# Patient Record
Sex: FEMALE | Race: WHITE | URBAN - METROPOLITAN AREA
[De-identification: names, ages, dates, MRNs, and addresses within clinical notes are randomized per-mention and may not be internally consistent; named-entity substitution may affect disease eponyms.]

---

## 2018-11-23 ENCOUNTER — OFFICE VISIT (OUTPATIENT)
Dept: FAMILY MEDICINE CLINIC | Facility: CLINIC | Age: 43
End: 2018-11-23
Payer: COMMERCIAL

## 2018-11-23 VITALS
SYSTOLIC BLOOD PRESSURE: 100 MMHG | DIASTOLIC BLOOD PRESSURE: 68 MMHG | HEART RATE: 108 BPM | TEMPERATURE: 98 F | OXYGEN SATURATION: 98 % | RESPIRATION RATE: 20 BRPM

## 2018-11-23 DIAGNOSIS — J00 ACUTE NASOPHARYNGITIS: Primary | ICD-10-CM

## 2018-11-23 PROCEDURE — 99202 OFFICE O/P NEW SF 15 MIN: CPT | Performed by: NURSE PRACTITIONER

## 2018-11-23 RX ORDER — RIZATRIPTAN BENZOATE 10 MG/1
10 TABLET ORAL AS NEEDED
Refills: 0 | COMMUNITY
Start: 2018-11-13 | End: 2022-02-03 | Stop reason: ALTCHOICE

## 2018-11-23 NOTE — PROGRESS NOTES
CHIEF COMPLAINT:   Patient presents with:  Sore Throat  Ear Pain      HPI:   You Joaquin is a 44year old female who presents for upper respiratory symptoms for  4 days. Patient reports sore throat only at the beginning of sx's, congestion, ear pain.  [facility] Requested Prescriptions      No prescriptions requested or ordered in this encounter     Risks, benefits, and side effects of medication explained and discussed. Pt advised on use of OTC medications like Dayquil/Nyquil for symptom relief.       The patient · Put fluids back into your body. Take frequent sips of clear liquids such as water or broth. Avoid drinks that have a lot of sugar in them, such as juices and sodas. These can make diarrhea worse. Older children and adults can drink sports drinks.   · As y

## 2018-11-25 ENCOUNTER — TELEPHONE (OUTPATIENT)
Dept: FAMILY MEDICINE CLINIC | Facility: CLINIC | Age: 43
End: 2018-11-25

## 2018-11-25 NOTE — TELEPHONE ENCOUNTER
Pt called stating that her ST is not any better after 6 days and that she is losing her voice. Pt requesting something for the pain.  Vis lidocaine sent into pharm, pt informed

## 2018-11-25 NOTE — TELEPHONE ENCOUNTER
SW pt's fiance, requesting Hydrocodone for pain relief at this time. Explained that Viscous lidocaine was sent into the pharmacy and Vicodin cannot be prescribed here.  Pt's fiance argumentative about getting antibiotic \"why not air on the side of caution\

## 2022-02-03 PROBLEM — F32.5 MAJOR DEPRESSIVE DISORDER IN FULL REMISSION: Status: ACTIVE | Noted: 2022-02-03

## 2022-02-03 PROBLEM — G43.109 MIGRAINE WITH AURA AND WITHOUT STATUS MIGRAINOSUS, NOT INTRACTABLE: Status: RESOLVED | Noted: 2022-02-03 | Resolved: 2022-02-03

## 2022-02-03 PROBLEM — G43.009 MIGRAINE WITHOUT AURA AND WITHOUT STATUS MIGRAINOSUS, NOT INTRACTABLE: Status: ACTIVE | Noted: 2022-02-03

## 2022-02-03 PROBLEM — G43.109 MIGRAINE WITH AURA AND WITHOUT STATUS MIGRAINOSUS, NOT INTRACTABLE: Status: ACTIVE | Noted: 2022-02-03

## 2022-02-03 PROBLEM — F32.5 MAJOR DEPRESSIVE DISORDER IN FULL REMISSION (HCC): Status: ACTIVE | Noted: 2022-02-03

## 2022-02-04 ENCOUNTER — MED REC SCAN ONLY (OUTPATIENT)
Dept: INTERNAL MEDICINE CLINIC | Facility: CLINIC | Age: 47
End: 2022-02-04

## 2022-03-10 RX ORDER — BUPROPION HYDROCHLORIDE 150 MG/1
TABLET ORAL
Qty: 30 TABLET | Refills: 0 | OUTPATIENT
Start: 2022-03-10

## 2022-03-10 NOTE — TELEPHONE ENCOUNTER
Last OV pertinent to medication 2/3/22  Last refill date: 2/3/22#/refills: 30 w/ 0  When patient was asked to return for OV: 4 weeks for short term follow up  Upcomming appt/reason: None  Labs: NA        LMOVM to COB to schedule appointment.

## 2022-03-24 ENCOUNTER — TELEPHONE (OUTPATIENT)
Dept: INTERNAL MEDICINE CLINIC | Facility: CLINIC | Age: 47
End: 2022-03-24

## 2022-03-24 NOTE — TELEPHONE ENCOUNTER
Pt has mammogram ordered and Pt stated that it needs to be screening and not diagnostic. Please change order to be screening. Notify Pt when complete so she can schedule apppt.    Thank you

## 2022-04-05 ENCOUNTER — HOSPITAL ENCOUNTER (OUTPATIENT)
Dept: MAMMOGRAPHY | Facility: HOSPITAL | Age: 47
Discharge: HOME | End: 2022-04-05
Attending: INTERNAL MEDICINE
Payer: COMMERCIAL

## 2022-04-05 DIAGNOSIS — Z12.31 SCREENING MAMMOGRAM FOR BREAST CANCER: ICD-10-CM

## 2022-04-05 PROCEDURE — 77063 BREAST TOMOSYNTHESIS BI: CPT | Performed by: INTERNAL MEDICINE

## 2022-04-05 PROCEDURE — 77067 SCR MAMMO BI INCL CAD: CPT | Performed by: INTERNAL MEDICINE

## 2022-04-07 ENCOUNTER — OFFICE VISIT (OUTPATIENT)
Dept: INTERNAL MEDICINE CLINIC | Facility: CLINIC | Age: 47
End: 2022-04-07
Payer: COMMERCIAL

## 2022-04-07 VITALS
RESPIRATION RATE: 16 BRPM | TEMPERATURE: 97 F | WEIGHT: 145 LBS | SYSTOLIC BLOOD PRESSURE: 102 MMHG | HEART RATE: 80 BPM | OXYGEN SATURATION: 98 % | BODY MASS INDEX: 24 KG/M2 | DIASTOLIC BLOOD PRESSURE: 80 MMHG

## 2022-04-07 DIAGNOSIS — R04.0 EPISTAXIS: ICD-10-CM

## 2022-04-07 DIAGNOSIS — F32.5 MAJOR DEPRESSIVE DISORDER IN FULL REMISSION, UNSPECIFIED WHETHER RECURRENT (HCC): ICD-10-CM

## 2022-04-07 DIAGNOSIS — R92.8 ABNORMAL MAMMOGRAM: ICD-10-CM

## 2022-04-07 DIAGNOSIS — R51.9 INCREASED FREQUENCY OF HEADACHES: Primary | ICD-10-CM

## 2022-04-07 PROCEDURE — 3074F SYST BP LT 130 MM HG: CPT | Performed by: INTERNAL MEDICINE

## 2022-04-07 PROCEDURE — 99215 OFFICE O/P EST HI 40 MIN: CPT | Performed by: INTERNAL MEDICINE

## 2022-04-07 PROCEDURE — 3079F DIAST BP 80-89 MM HG: CPT | Performed by: INTERNAL MEDICINE

## 2022-04-07 RX ORDER — BUPROPION HYDROCHLORIDE 300 MG/1
300 TABLET ORAL DAILY
Qty: 90 TABLET | Refills: 1 | Status: SHIPPED | OUTPATIENT
Start: 2022-04-07

## 2022-04-07 RX ORDER — AZITHROMYCIN 250 MG/1
500 TABLET, FILM COATED ORAL DAILY
COMMUNITY
Start: 2022-03-14 | End: 2022-04-07 | Stop reason: ALTCHOICE

## 2022-04-07 RX ORDER — ACETAMINOPHEN AND CODEINE PHOSPHATE 300; 30 MG/1; MG/1
TABLET ORAL
COMMUNITY
Start: 2022-03-15 | End: 2022-04-07

## 2022-04-11 ENCOUNTER — MED REC SCAN ONLY (OUTPATIENT)
Dept: INTERNAL MEDICINE CLINIC | Facility: CLINIC | Age: 47
End: 2022-04-11

## 2022-04-26 ENCOUNTER — TELEPHONE (OUTPATIENT)
Dept: INTERNAL MEDICINE CLINIC | Facility: CLINIC | Age: 47
End: 2022-04-26

## 2022-04-27 ENCOUNTER — HOSPITAL ENCOUNTER (OUTPATIENT)
Dept: MAMMOGRAPHY | Facility: HOSPITAL | Age: 47
Discharge: HOME | End: 2022-04-27
Attending: INTERNAL MEDICINE
Payer: COMMERCIAL

## 2022-04-27 DIAGNOSIS — R92.8 ABNORMAL MAMMOGRAM: ICD-10-CM

## 2022-04-27 DIAGNOSIS — R92.2 INCONCLUSIVE MAMMOGRAM: ICD-10-CM

## 2022-04-27 PROCEDURE — 77062 BREAST TOMOSYNTHESIS BI: CPT | Performed by: INTERNAL MEDICINE

## 2022-04-27 PROCEDURE — 76642 ULTRASOUND BREAST LIMITED: CPT | Performed by: INTERNAL MEDICINE

## 2022-04-27 PROCEDURE — 77066 DX MAMMO INCL CAD BI: CPT | Performed by: INTERNAL MEDICINE

## 2022-05-09 ENCOUNTER — OFFICE VISIT (OUTPATIENT)
Dept: OBGYN CLINIC | Facility: CLINIC | Age: 47
End: 2022-05-09
Payer: COMMERCIAL

## 2022-05-09 VITALS
SYSTOLIC BLOOD PRESSURE: 102 MMHG | DIASTOLIC BLOOD PRESSURE: 52 MMHG | HEIGHT: 65.75 IN | HEART RATE: 91 BPM | WEIGHT: 142 LBS | BODY MASS INDEX: 23.1 KG/M2

## 2022-05-09 DIAGNOSIS — R63.8 UNABLE TO LOSE WEIGHT: ICD-10-CM

## 2022-05-09 DIAGNOSIS — R68.82 LOW LIBIDO: ICD-10-CM

## 2022-05-09 DIAGNOSIS — R61 NIGHT SWEATS: ICD-10-CM

## 2022-05-09 DIAGNOSIS — K59.09 CHRONIC CONSTIPATION: ICD-10-CM

## 2022-05-09 DIAGNOSIS — K92.1 BLOOD IN STOOL: ICD-10-CM

## 2022-05-09 DIAGNOSIS — N89.8 VAGINAL DRYNESS: ICD-10-CM

## 2022-05-09 DIAGNOSIS — M62.89 PELVIC FLOOR DYSFUNCTION IN FEMALE: ICD-10-CM

## 2022-05-09 DIAGNOSIS — R10.31 ABDOMINAL PAIN, RIGHT LOWER QUADRANT: ICD-10-CM

## 2022-05-09 DIAGNOSIS — N94.10 DYSPAREUNIA IN FEMALE: ICD-10-CM

## 2022-05-09 DIAGNOSIS — Z78.0 POSTMENOPAUSAL: Primary | ICD-10-CM

## 2022-05-09 DIAGNOSIS — N95.1 HOT FLASHES DUE TO MENOPAUSE: ICD-10-CM

## 2022-05-09 PROBLEM — E28.319 EARLY MENOPAUSE: Status: ACTIVE | Noted: 2021-04-28

## 2022-05-09 PROBLEM — C80.1 MALIGNANT NEOPLASM (HCC): Status: ACTIVE | Noted: 2022-05-09

## 2022-05-09 PROBLEM — E28.319 EARLY MENOPAUSE: Status: RESOLVED | Noted: 2021-04-28 | Resolved: 2022-05-09

## 2022-05-09 PROBLEM — M54.2 NECK PAIN: Status: ACTIVE | Noted: 2017-11-15

## 2022-05-09 LAB
CONTROL LINE PRESENT WITH A CLEAR BACKGROUND (YES/NO): YES YES/NO
PREGNANCY TEST, URINE: NEGATIVE

## 2022-05-09 PROCEDURE — 87660 TRICHOMONAS VAGIN DIR PROBE: CPT | Performed by: OBSTETRICS & GYNECOLOGY

## 2022-05-09 PROCEDURE — 87480 CANDIDA DNA DIR PROBE: CPT | Performed by: OBSTETRICS & GYNECOLOGY

## 2022-05-09 PROCEDURE — 87510 GARDNER VAG DNA DIR PROBE: CPT | Performed by: OBSTETRICS & GYNECOLOGY

## 2022-05-09 NOTE — PATIENT INSTRUCTIONS
Magnesium oxide 250-500 mg nightly   Or   Magnesium glycinate 250-500 mg nightly    Oxide is easier to find, cheaper, fairly well absorbed  Glycinate is harder to find, more expensive, but better absorbed, may have less GI side effects    Probably best form of magnesium for headaches is magnesium threonate. This is significantly more expensive & usually has to be ordered online. If oxide or glycinate forms are not working well enough, this one may be the next to try. Vitamin D3 [id number] international units per day. Omega 3 fatty acids (DHA & EPA)       The Role of Physical Therapy in the Treatment of Pelvic Floor Dysfunction:    Physical therapists are trained to evaluate and treat dysfunctions in the joints, muscles, nerves and scar. Physical therapists specifically trained in the area of pelvic health can identify the possible musculoskeletal causes of pelvic pain, bladder and bowel difficulties and develop a treatment plan specific to the individual suffering from this difficulties. What to expect at your first physical therapy appointment:   Your first visit will include an initial evaluation in a comfortable, private room by a therapist who has undergone advanced education and training in the evaluation and treatment of pelvic muscle dysfunction. The therapist will obtain a detailed history of your health, pain and activity limitations. She will also ask you about any bowel, bladder and sexual difficulties as these are in part controlled by the pelvic muscles. The therapist will then take a look at your posture, mobility of your spine and hips and strength and flexibility of pelvic girdle muscles. She will examine any scar tissue and trigger points in the muscles of your pelvic region. The therapist will also specifically examine the pelvic floor muscles. Your pelvic floor consists of a group of muscles that attach behind the pubic bone in the front to the tail bone in the back.  They are responsible for providing support to the pelvic joints and organs, relaxing to allow the passage of urine, stool and gas and mikey to prevent the loss of urine, stool and gas as appropriate. In order to best examine these muscles you will be asked to undress from the waist down and be covered with a sheet. The therapist will use a lubricated, gloved finger to identify painful muscles around and in your vagina or rectum then instruct you to contract and relax these muscles in order to determine how the muscles are functioning. Care is taken to make you as comfortable as possible with the exam.     Your therapist will discuss the evaluation results with you and provide you with education regarding your specific condition and the expectation of therapy. She will answer all of your questions and will work with you to establish a treatment plan based on the results of the evaluation and your goals for therapy. THE [facility] Pelvic Floor Physical Therapy    [address], [address] [facility] [place], [address] Ph: [phone] [address], [facility], 2nd floor. [facility]. Ph: [phone] & [phone]  [address], [place], [address] [facility]. Ph [phone]  [address], [address]. Ph [phone]  [address], [facility]. Ph [phone]  [address] [place] [address]. Ph [phone]     Lubricants    Aloe Cadabra  Good Clean Love  Pre-Seed (targeted for those attempting to conceive)   Restore    *Lubricants that are hypo-osmolar or iso-osmolar are preferable to hyper-osmolar formulations.

## 2022-05-10 PROBLEM — N76.0 BV (BACTERIAL VAGINOSIS): Status: ACTIVE | Noted: 2022-05-10

## 2022-05-10 PROBLEM — B96.89 BV (BACTERIAL VAGINOSIS): Status: ACTIVE | Noted: 2022-05-10

## 2022-06-08 ENCOUNTER — TELEPHONE (OUTPATIENT)
Dept: INTERNAL MEDICINE CLINIC | Facility: CLINIC | Age: 47
End: 2022-06-08

## 2022-06-08 NOTE — TELEPHONE ENCOUNTER
Patient called and was transferred to me due to being frustrated with the advise to go to a walk in clinic in [facility] where she is on vacation. Patient stated she has UTI s/s of frequent urination and needs an antibiotic. I explained to the patient we need an appointment and urine test in order to safely and properly prescribe. I also informed the pt that the providers can only conduct virtual visits if the patient is in the state of [place]. Patient hung up the call.

## 2022-07-27 RX ORDER — RIZATRIPTAN BENZOATE 10 MG/1
TABLET ORAL
Qty: 30 TABLET | Refills: 1 | Status: SHIPPED | OUTPATIENT
Start: 2022-07-27

## 2022-07-27 NOTE — TELEPHONE ENCOUNTER
Last OV relevant to medication: 4/7/22  Last refill date: 2/3/22 30    #/refills: 1  When pt was asked to return for OV: 10/7/22  Upcoming appt/reason: No future appointments.     Was pt informed of any over due labs: n/a   Lab Results   Component Value Date    GLU 86 05/18/2022    BUN 13 05/18/2022    BUNCREA NOT APPLICABLE 38/48/9659    CREATSERUM 0.95 05/18/2022    GFRNAA 72 05/18/2022    GFRAA 83 05/18/2022    CA 9.5 05/18/2022    ALKPHO 74 05/18/2022    AST 18 05/18/2022    ALT 11 05/18/2022    BILT 0.6 05/18/2022    TP 6.4 05/18/2022    ALB 4.2 05/18/2022    GLOBULIN 2.2 05/18/2022    AGRATIO 1.9 05/18/2022     05/18/2022    K 4.2 05/18/2022     05/18/2022    CO2 26 05/18/2022

## 2022-11-05 ENCOUNTER — TELEPHONE (OUTPATIENT)
Dept: INTERNAL MEDICINE CLINIC | Facility: CLINIC | Age: 47
End: 2022-11-05

## 2022-11-05 NOTE — TELEPHONE ENCOUNTER
Incoming (mail or fax):  fax  Received from:  [facility]  Documentation given to:  Dr Cheatham Shows incoming bin    Pathology resutls

## 2022-11-10 RX ORDER — BUPROPION HYDROCHLORIDE 300 MG/1
300 TABLET ORAL DAILY
Qty: 90 TABLET | Refills: 0 | Status: SHIPPED | OUTPATIENT
Start: 2022-11-10 | End: 2023-04-25

## 2022-11-15 ENCOUNTER — MED REC SCAN ONLY (OUTPATIENT)
Dept: INTERNAL MEDICINE CLINIC | Facility: CLINIC | Age: 47
End: 2022-11-15

## 2022-11-16 PROBLEM — B96.89 BV (BACTERIAL VAGINOSIS): Status: RESOLVED | Noted: 2022-05-10 | Resolved: 2022-11-16

## 2022-11-16 PROBLEM — N76.0 BV (BACTERIAL VAGINOSIS): Status: RESOLVED | Noted: 2022-05-10 | Resolved: 2022-11-16

## 2022-11-16 PROBLEM — R10.31 ABDOMINAL PAIN, RIGHT LOWER QUADRANT: Status: RESOLVED | Noted: 2022-05-09 | Resolved: 2022-11-16

## 2022-11-16 PROBLEM — K92.1 BLOOD IN STOOL: Status: RESOLVED | Noted: 2022-05-09 | Resolved: 2022-11-16

## 2022-11-30 ENCOUNTER — TELEPHONE (OUTPATIENT)
Dept: INTERNAL MEDICINE CLINIC | Facility: CLINIC | Age: 47
End: 2022-11-30

## 2022-11-30 NOTE — TELEPHONE ENCOUNTER
LMTCB to Confirm with our office if Pt had reached out to her Ins [employer] yet regarding coverage for Cologuard. It was noted in 11/16/22 OV that pt was to check with Ins [employer] and get back to our office on whether we're to Fax the Cologuard Order Form or Not? Awaiting Call Back.

## 2022-12-29 ENCOUNTER — MED REC SCAN ONLY (OUTPATIENT)
Dept: INTERNAL MEDICINE CLINIC | Facility: CLINIC | Age: 47
End: 2022-12-29

## 2023-02-22 ENCOUNTER — TELEPHONE (OUTPATIENT)
Facility: CLINIC | Age: 48
End: 2023-02-22

## 2023-02-22 ENCOUNTER — OFFICE VISIT (OUTPATIENT)
Facility: CLINIC | Age: 48
End: 2023-02-22
Payer: COMMERCIAL

## 2023-02-22 VITALS
BODY MASS INDEX: 22.77 KG/M2 | DIASTOLIC BLOOD PRESSURE: 60 MMHG | WEIGHT: 140 LBS | SYSTOLIC BLOOD PRESSURE: 98 MMHG | HEIGHT: 65.75 IN | HEART RATE: 99 BPM

## 2023-02-22 DIAGNOSIS — Z01.419 WELL WOMAN EXAM WITH ROUTINE GYNECOLOGICAL EXAM: ICD-10-CM

## 2023-02-22 DIAGNOSIS — Z12.31 ENCOUNTER FOR SCREENING MAMMOGRAM FOR BREAST CANCER: ICD-10-CM

## 2023-02-22 DIAGNOSIS — Z12.4 PAPANICOLAOU SMEAR FOR CERVICAL CANCER SCREENING: Primary | ICD-10-CM

## 2023-02-22 PROCEDURE — 3078F DIAST BP <80 MM HG: CPT | Performed by: OBSTETRICS & GYNECOLOGY

## 2023-02-22 PROCEDURE — 3074F SYST BP LT 130 MM HG: CPT | Performed by: OBSTETRICS & GYNECOLOGY

## 2023-02-22 PROCEDURE — 3008F BODY MASS INDEX DOCD: CPT | Performed by: OBSTETRICS & GYNECOLOGY

## 2023-02-22 PROCEDURE — 99396 PREV VISIT EST AGE 40-64: CPT | Performed by: OBSTETRICS & GYNECOLOGY

## 2023-02-22 RX ORDER — ESTRADIOL 0.1 MG/G
1 CREAM VAGINAL DAILY
Qty: 1 EACH | Refills: 11 | Status: SHIPPED | OUTPATIENT
Start: 2023-02-22

## 2023-02-22 RX ORDER — PROGESTERONE 200 MG/1
200 CAPSULE ORAL AS DIRECTED
Qty: 30 CAPSULE | Refills: 11 | Status: SHIPPED | OUTPATIENT
Start: 2023-02-22

## 2023-02-22 NOTE — TELEPHONE ENCOUNTER
Name//spelling verified by pt review of label  Tubes labeled legible in front of patient  Hereditary Cancer Screening drawn, pt tolerated well. [employer] access, cost, result discussed. Pt. Kavin Mustafa. Specimen collected and placed in bin at clinical workstation for further processing by clinical staff.       [employer] COMMON HEREDITARY CANCERS PANEL [[id number]]  Ordered by Dr. Piero Mancilla

## 2023-03-06 ENCOUNTER — TELEPHONE (OUTPATIENT)
Facility: CLINIC | Age: 48
End: 2023-03-06

## 2023-04-25 RX ORDER — BUPROPION HYDROCHLORIDE 300 MG/1
TABLET ORAL
Qty: 90 TABLET | Refills: 0 | Status: SHIPPED | OUTPATIENT
Start: 2023-04-25

## 2023-04-25 NOTE — TELEPHONE ENCOUNTER
Last OV relevant to medication: 11/16/22  Last refill date: 11/10/22 90   #/refills: 0  When pt was asked to return for OV: 6 months   Upcoming appt/reason:   Future Appointments   Date Time Provider Hortencia Ramos   5/10/2023 11:00 AM La Perez MD [facility] [place]   5/17/2023 11:00 AM FRANKLYN Castlilo [facility] [address] [place]       Was pt informed of any over due labs: n/a   Lab Results   Component Value Date    GLU 86 05/18/2022    BUN 13 05/18/2022    BUNCREA NOT APPLICABLE 50/60/6746    CREATSERUM 0.95 05/18/2022    GFRNAA 72 05/18/2022    GFRAA 83 05/18/2022    CA 9.5 05/18/2022    ALKPHO 74 05/18/2022    AST 18 05/18/2022    ALT 11 05/18/2022    BILT 0.6 05/18/2022    TP 6.4 05/18/2022    ALB 4.2 05/18/2022    GLOBULIN 2.2 05/18/2022    AGRATIO 1.9 05/18/2022     05/18/2022    K 4.2 05/18/2022     05/18/2022    CO2 26 05/18/2022

## 2023-05-17 ENCOUNTER — OFFICE VISIT (OUTPATIENT)
Dept: INTERNAL MEDICINE CLINIC | Facility: CLINIC | Age: 48
End: 2023-05-17
Payer: COMMERCIAL

## 2023-05-17 VITALS
RESPIRATION RATE: 14 BRPM | TEMPERATURE: 98 F | HEART RATE: 78 BPM | OXYGEN SATURATION: 98 % | SYSTOLIC BLOOD PRESSURE: 114 MMHG | HEIGHT: 66.14 IN | DIASTOLIC BLOOD PRESSURE: 72 MMHG | BODY MASS INDEX: 22.96 KG/M2 | WEIGHT: 142.88 LBS

## 2023-05-17 DIAGNOSIS — Z12.11 ENCOUNTER FOR SCREENING FOR MALIGNANT NEOPLASM OF COLON: ICD-10-CM

## 2023-05-17 DIAGNOSIS — G43.009 MIGRAINE WITHOUT AURA AND WITHOUT STATUS MIGRAINOSUS, NOT INTRACTABLE: ICD-10-CM

## 2023-05-17 DIAGNOSIS — Z13.29 SCREENING FOR THYROID DISORDER: ICD-10-CM

## 2023-05-17 DIAGNOSIS — Z00.00 ENCOUNTER FOR ANNUAL PHYSICAL EXAM: Primary | ICD-10-CM

## 2023-05-17 DIAGNOSIS — B35.1 ONYCHOMYCOSIS: ICD-10-CM

## 2023-05-17 DIAGNOSIS — K21.9 GASTROESOPHAGEAL REFLUX DISEASE, UNSPECIFIED WHETHER ESOPHAGITIS PRESENT: ICD-10-CM

## 2023-05-17 DIAGNOSIS — Z85.828 HISTORY OF BASAL CELL CARCINOMA (BCC): ICD-10-CM

## 2023-05-17 DIAGNOSIS — Z13.220 SCREENING FOR LIPID DISORDERS: ICD-10-CM

## 2023-05-17 DIAGNOSIS — F33.41 RECURRENT MAJOR DEPRESSIVE DISORDER, IN PARTIAL REMISSION (HCC): ICD-10-CM

## 2023-05-17 DIAGNOSIS — Z13.1 SCREENING FOR DIABETES MELLITUS: ICD-10-CM

## 2023-05-17 PROBLEM — M54.2 NECK PAIN: Status: RESOLVED | Noted: 2017-11-15 | Resolved: 2023-05-17

## 2023-05-17 PROCEDURE — 99396 PREV VISIT EST AGE 40-64: CPT | Performed by: NURSE PRACTITIONER

## 2023-05-17 PROCEDURE — 3078F DIAST BP <80 MM HG: CPT | Performed by: NURSE PRACTITIONER

## 2023-05-17 PROCEDURE — 3008F BODY MASS INDEX DOCD: CPT | Performed by: NURSE PRACTITIONER

## 2023-05-17 PROCEDURE — 99214 OFFICE O/P EST MOD 30 MIN: CPT | Performed by: NURSE PRACTITIONER

## 2023-05-17 PROCEDURE — 3074F SYST BP LT 130 MM HG: CPT | Performed by: NURSE PRACTITIONER

## 2023-05-17 RX ORDER — SERTRALINE HYDROCHLORIDE 25 MG/1
25 TABLET, FILM COATED ORAL DAILY
Qty: 90 TABLET | Refills: 0 | Status: SHIPPED | OUTPATIENT
Start: 2023-05-17

## 2023-06-20 NOTE — TELEPHONE ENCOUNTER
Patient states she is coming from over an hour away, and would be 15 minutes late. Patient asked to switch the cream she was prescribed to a pill because she does not like the cream. States Dr. Nando Matos once prescribed another cream that worked better and wants to know if she can either have a pill or the same cream MM prescribed a while ago. Please advise.

## 2023-06-21 RX ORDER — ESTRADIOL 1 MG/1
1 TABLET ORAL DAILY
Qty: 90 TABLET | Refills: 0 | OUTPATIENT
Start: 2023-06-21

## 2023-06-24 LAB
ABSOLUTE BASOPHILS: 49 CELLS/UL (ref 0–200)
ABSOLUTE EOSINOPHILS: 164 CELLS/UL (ref 15–500)
ABSOLUTE LYMPHOCYTES: 1173 CELLS/UL (ref 850–3900)
ABSOLUTE MONOCYTES: 508 CELLS/UL (ref 200–950)
ABSOLUTE NEUTROPHILS: 6306 CELLS/UL (ref 1500–7800)
ALBUMIN/GLOBULIN RATIO: 1.7 (CALC) (ref 1–2.5)
ALBUMIN: 4.4 G/DL (ref 3.6–5.1)
ALKALINE PHOSPHATASE: 102 U/L (ref 31–125)
ALT: 19 U/L (ref 6–29)
AST: 18 U/L (ref 10–35)
BASOPHILS: 0.6 %
BILIRUBIN, TOTAL: 0.8 MG/DL (ref 0.2–1.2)
BUN: 15 MG/DL (ref 7–25)
CALCIUM: 10.2 MG/DL (ref 8.6–10.2)
CARBON DIOXIDE: 26 MMOL/L (ref 20–32)
CHLORIDE: 108 MMOL/L (ref 98–110)
CHOL/HDLC RATIO: 2.3 (CALC)
CHOLESTEROL, TOTAL: 158 MG/DL
CREATININE: 0.96 MG/DL (ref 0.5–0.99)
EGFR: 73 ML/MIN/1.73M2
EOSINOPHILS: 2 %
GLOBULIN: 2.6 G/DL (CALC) (ref 1.9–3.7)
GLUCOSE: 94 MG/DL (ref 65–139)
HDL CHOLESTEROL: 69 MG/DL
HEMATOCRIT: 42.5 % (ref 35–45)
HEMOGLOBIN A1C: 4.5 % OF TOTAL HGB
HEMOGLOBIN: 14.4 G/DL (ref 11.7–15.5)
LDL-CHOLESTEROL: 73 MG/DL (CALC)
LYMPHOCYTES: 14.3 %
MCH: 32.3 PG (ref 27–33)
MCHC: 33.9 G/DL (ref 32–36)
MCV: 95.3 FL (ref 80–100)
MONOCYTES: 6.2 %
MPV: 10.8 FL (ref 7.5–12.5)
NEUTROPHILS: 76.9 %
NON-HDL CHOLESTEROL: 89 MG/DL (CALC)
PLATELET COUNT: 265 THOUSAND/UL (ref 140–400)
POTASSIUM: 4.3 MMOL/L (ref 3.5–5.3)
PROTEIN, TOTAL: 7 G/DL (ref 6.1–8.1)
RDW: 12 % (ref 11–15)
RED BLOOD CELL COUNT: 4.46 MILLION/UL (ref 3.8–5.1)
SODIUM: 142 MMOL/L (ref 135–146)
TRIGLYCERIDES: 75 MG/DL
TSH W/REFLEX TO FT4: 3.93 MIU/L
WHITE BLOOD CELL COUNT: 8.2 THOUSAND/UL (ref 3.8–10.8)

## 2023-06-26 DIAGNOSIS — G43.009 MIGRAINE WITHOUT AURA AND WITHOUT STATUS MIGRAINOSUS, NOT INTRACTABLE: ICD-10-CM

## 2023-06-28 RX ORDER — RIZATRIPTAN BENZOATE 10 MG/1
10 TABLET ORAL AS NEEDED
Qty: 30 TABLET | Refills: 0 | Status: SHIPPED | OUTPATIENT
Start: 2023-06-28

## 2023-07-06 ENCOUNTER — TELEPHONE (OUTPATIENT)
Dept: INTERNAL MEDICINE CLINIC | Facility: CLINIC | Age: 48
End: 2023-07-06

## 2023-07-06 LAB — AMB EXT COLOGUARD RESULT: NEGATIVE

## 2023-07-06 NOTE — TELEPHONE ENCOUNTER
Incoming (mail or fax):  fax  Received from:  [employer]  Documentation given to:  Triage results bin    [facility]

## 2023-07-07 DIAGNOSIS — G43.009 MIGRAINE WITHOUT AURA AND WITHOUT STATUS MIGRAINOSUS, NOT INTRACTABLE: ICD-10-CM

## 2023-07-07 RX ORDER — RIZATRIPTAN BENZOATE 10 MG/1
TABLET ORAL
Qty: 30 TABLET | Refills: 0 | OUTPATIENT
Start: 2023-07-07

## 2023-08-24 DIAGNOSIS — G43.009 MIGRAINE WITHOUT AURA AND WITHOUT STATUS MIGRAINOSUS, NOT INTRACTABLE: ICD-10-CM

## 2023-08-24 NOTE — TELEPHONE ENCOUNTER
Last OV relevant to medication: 5.17.23   Last refill date:      #/refills:    Rizatriptan Benzoate     6.28.23        30/0  Bupropion                       4.25.23        90/0  When pt was asked to return for OV: Return in about 1 year (around 5/17/2024) for physical, med check. Upcoming appt/reason: no future appts scheduled. Was pt informed of any over due labs: appears utd; no new labs ordered    -\"She will think about starting therapy and let us know if interested\" per note from 5.17.23  [facility] Sent to patient to reach out neuro with contact information to schedule an appt as discussed during her last visit.        Lab Results   Component Value Date    GLU 94 06/23/2023    BUN 15 06/23/2023    BUNCREA NOT APPLICABLE 59/97/5630    CREATSERUM 0.96 06/23/2023    GFRNAA 72 05/18/2022    GFRAA 83 05/18/2022    CA 10.2 06/23/2023    ALKPHO 102 06/23/2023    AST 18 06/23/2023    ALT 19 06/23/2023    BILT 0.8 06/23/2023    TP 7.0 06/23/2023    ALB 4.4 06/23/2023    GLOBULIN 2.6 06/23/2023    AGRATIO 1.7 06/23/2023     06/23/2023    K 4.3 06/23/2023     06/23/2023    CO2 26 06/23/2023

## 2023-08-25 RX ORDER — RIZATRIPTAN BENZOATE 10 MG/1
10 TABLET ORAL AS NEEDED
Qty: 30 TABLET | Refills: 0 | Status: SHIPPED | OUTPATIENT
Start: 2023-08-25

## 2023-08-25 RX ORDER — BUPROPION HYDROCHLORIDE 300 MG/1
300 TABLET ORAL DAILY
Qty: 90 TABLET | Refills: 2 | Status: SHIPPED | OUTPATIENT
Start: 2023-08-25

## 2023-08-31 ENCOUNTER — TELEPHONE (OUTPATIENT)
Facility: CLINIC | Age: 48
End: 2023-08-31

## 2023-08-31 DIAGNOSIS — F33.41 RECURRENT MAJOR DEPRESSIVE DISORDER, IN PARTIAL REMISSION (HCC): ICD-10-CM

## 2023-08-31 RX ORDER — PROGESTERONE 200 MG/1
200 CAPSULE ORAL NIGHTLY
Qty: 90 CAPSULE | Refills: 5 | Status: SHIPPED | OUTPATIENT
Start: 2023-08-31

## 2023-08-31 RX ORDER — SERTRALINE HYDROCHLORIDE 25 MG/1
25 TABLET, FILM COATED ORAL DAILY
Qty: 90 TABLET | Refills: 1 | Status: SHIPPED | OUTPATIENT
Start: 2023-08-31

## 2023-08-31 RX ORDER — ESTRADIOL 1 MG/1
1 TABLET ORAL DAILY
Qty: 90 TABLET | Refills: 5 | Status: SHIPPED | OUTPATIENT
Start: 2023-08-31

## 2023-10-12 DIAGNOSIS — G43.009 MIGRAINE WITHOUT AURA AND WITHOUT STATUS MIGRAINOSUS, NOT INTRACTABLE: ICD-10-CM

## 2023-10-12 RX ORDER — RIZATRIPTAN BENZOATE 10 MG/1
10 TABLET ORAL AS NEEDED
Qty: 30 TABLET | Refills: 0 | Status: SHIPPED | OUTPATIENT
Start: 2023-10-12

## 2023-10-12 NOTE — TELEPHONE ENCOUNTER
Last OV relevant to medication: 5/17/23  Last refill date: 8/25/23 #30/refills: 0  When pt was asked to return for OV: 5/17/24  Upcoming appt/reason:   Future Appointments   Date Time Provider Hortencia Ramos   12/4/2023 10:30 AM Godfrey Avalos MD [facility] [place]   12/6/2023 10:40 AM Monica Guzman DO [facility]   Was pt informed of any over due labs: betty

## 2023-12-04 ENCOUNTER — OFFICE VISIT (OUTPATIENT)
Facility: CLINIC | Age: 48
End: 2023-12-04
Payer: COMMERCIAL

## 2023-12-04 VITALS
HEART RATE: 80 BPM | RESPIRATION RATE: 22 BRPM | HEIGHT: 66.14 IN | BODY MASS INDEX: 23.3 KG/M2 | WEIGHT: 145 LBS | SYSTOLIC BLOOD PRESSURE: 118 MMHG | DIASTOLIC BLOOD PRESSURE: 70 MMHG

## 2023-12-04 DIAGNOSIS — R68.82 LOW LIBIDO: ICD-10-CM

## 2023-12-04 DIAGNOSIS — M62.89 PELVIC FLOOR DYSFUNCTION IN FEMALE: ICD-10-CM

## 2023-12-04 DIAGNOSIS — Z80.3 FAMILY HISTORY OF BREAST CANCER IN SISTER: ICD-10-CM

## 2023-12-04 DIAGNOSIS — R23.2 HOT FLASHES: ICD-10-CM

## 2023-12-04 DIAGNOSIS — Z01.411 ENCOUNTER FOR WELL WOMAN EXAM WITH ABNORMAL FINDINGS: Primary | ICD-10-CM

## 2023-12-04 DIAGNOSIS — Z12.31 ENCOUNTER FOR SCREENING MAMMOGRAM FOR MALIGNANT NEOPLASM OF BREAST: ICD-10-CM

## 2023-12-04 DIAGNOSIS — R39.14 FEELING OF INCOMPLETE BLADDER EMPTYING: ICD-10-CM

## 2023-12-04 DIAGNOSIS — Z63.9 RELATIONSHIP DYSFUNCTION: ICD-10-CM

## 2023-12-04 DIAGNOSIS — Z12.4 SCREENING FOR CERVICAL CANCER: ICD-10-CM

## 2023-12-04 DIAGNOSIS — R35.0 URINARY FREQUENCY: ICD-10-CM

## 2023-12-04 PROCEDURE — 88175 CYTOPATH C/V AUTO FLUID REDO: CPT | Performed by: OBSTETRICS & GYNECOLOGY

## 2023-12-04 PROCEDURE — 87624 HPV HI-RISK TYP POOLED RSLT: CPT | Performed by: OBSTETRICS & GYNECOLOGY

## 2023-12-04 RX ORDER — ESTRADIOL 0.05 MG/D
1 PATCH TRANSDERMAL WEEKLY
Qty: 12 PATCH | Refills: 3 | Status: SHIPPED | OUTPATIENT
Start: 2023-12-04

## 2023-12-04 RX ORDER — PROGESTERONE 200 MG/1
200 CAPSULE ORAL NIGHTLY
Qty: 90 CAPSULE | Refills: 5 | Status: SHIPPED | OUTPATIENT
Start: 2023-12-04

## 2023-12-04 SDOH — SOCIAL STABILITY - SOCIAL INSECURITY: PROBLEM RELATED TO PRIMARY SUPPORT GROUP, UNSPECIFIED: Z63.9

## 2023-12-04 NOTE — PATIENT INSTRUCTIONS
Magnesium supplementation  Different choices based on goals:    Oral route:  Magnesium oxide 250-500 mg nightly - better choice if constipated, cheaper, less well absorbed  Magnesium glycinate 250-500 mg nightly - harder to find, more expensive, better absorbed, fewer GI side effects  Alternatives: magnesium chloride, magnesium sulfate. Magnesium threonate 2000 mg nightly - NOT for use in pregnancy. Penetrates blood brain barrier best. Least GI effects, more expensive. Do NOT recommend magnesium citrate - this is a laxative. Do not recommend taking at same time as prenatal vitamin (calcium in prenatal vitamin competes with magnesium for absorption)    Non-oral route:   Epsom salt soaks (baths or foot baths - you can absorb magnesium through the skin)   Magnesium lotions/body sprays. Love languages    Sexual dysfunction    Unfortunately sexual dysfunction and satisfaction are very common and somewhat difficult to figure out because they are often multifactorial.     Optimizing physical & mental healthy, reducing stress, and working on the quality of your relationship with your partner are actually the most important. Unfortunately anxiety & depression as well as the drugs used to treat those conditions are often associated with sexual dysfunction. There are two medications currently on the market for premenopausal women that actually decrease serotonin levels. These have not been studied in women with mental health conditions or who are taking psychiatric medications. These medications are also limited in their efficacy.      Addyi  -oral taken nightly  -initially had a black box warning from the FDA regarding hypotension (low blood pressure & fainting) which can be made worse with alcohol    Vyleesi  -self-administered injection that is taken at least 45 min before you plan to have sex,   -limited to 1 use in 24 hours  -limited to 8 usees per month    There is also a non-FDA approved (herbal) option:  Ristela  -2 pills per day   -over the counter - through [employer]. Can order via phone or website. A great option is to see a sex therapist. Mere Walden can search for a provider near you through the following websites:    [employer] of Sexuality Educators, Counselors, and Therapists  FebruarySpecials.    Society for Sex Therapy and Research  https://sstarnet.org/     [facility] Pelvic Floor Physical Therapy    [address], [address] [place] [address] [place], [address] Ph: [phone]  [address], 2nd floor. [facility]. Ph: [phone] & [phone]  [address], [place], [address] [facility]. Ph [phone]  [address], [address], [address]. Ph [phone]  [address], [facility]. Ph [phone]  [address] [place] [address]. Ph [phone]     The Role of Physical Therapy in the Treatment of Pelvic Floor Dysfunction:    Physical therapists are trained to evaluate and treat dysfunctions in the joints, muscles, nerves and scar. Physical therapists specifically trained in the area of pelvic health can identify the possible musculoskeletal causes of pelvic pain, bladder and bowel difficulties and develop a treatment plan specific to the individual suffering from this difficulties. What to expect at your first physical therapy appointment:   Your first visit will include an initial evaluation in a comfortable, private room by a therapist who has undergone advanced education and training in the evaluation and treatment of pelvic muscle dysfunction. The therapist will obtain a detailed history of your health, pain and activity limitations. She will also ask you about any bowel, bladder and sexual difficulties as these are in part controlled by the pelvic muscles. The therapist will then take a look at your posture, mobility of your spine and hips and strength and flexibility of pelvic girdle muscles.  She will examine any scar tissue and trigger points in the muscles of your pelvic region. The therapist will also specifically examine the pelvic floor muscles. Your pelvic floor consists of a group of muscles that attach behind the pubic bone in the front to the tail bone in the back. They are responsible for providing support to the pelvic joints and organs, relaxing to allow the passage of urine, stool and gas and mikey to prevent the loss of urine, stool and gas as appropriate. In order to best examine these muscles you will be asked to undress from the waist down and be covered with a sheet. The therapist will use a lubricated, gloved finger to identify painful muscles around and in your vagina or rectum then instruct you to contract and relax these muscles in order to determine how the muscles are functioning. Care is taken to make you as comfortable as possible with the exam.     Your therapist will discuss the evaluation results with you and provide you with education regarding your specific condition and the expectation of therapy. She will answer all of your questions and will work with you to establish a treatment plan based on the results of the evaluation and your goals for therapy.

## 2023-12-05 LAB — HPV I/H RISK 1 DNA SPEC QL NAA+PROBE: NEGATIVE

## 2023-12-08 LAB
.: NORMAL
.: NORMAL

## 2023-12-19 DIAGNOSIS — G43.009 MIGRAINE WITHOUT AURA AND WITHOUT STATUS MIGRAINOSUS, NOT INTRACTABLE: ICD-10-CM

## 2023-12-19 RX ORDER — RIZATRIPTAN BENZOATE 10 MG/1
10 TABLET ORAL AS NEEDED
Qty: 30 TABLET | Refills: 0 | Status: SHIPPED | OUTPATIENT
Start: 2023-12-19

## 2023-12-19 NOTE — TELEPHONE ENCOUNTER
Last OV relevant to medication: 5/17/23  Last refill date: 10/12/23     #/refills: 30/0  When pt was asked to return for OV: 1 yr  Upcoming appt/reason: PE- none sched  Was pt informed of any over due labs: none

## 2024-02-05 DIAGNOSIS — G43.009 MIGRAINE WITHOUT AURA AND WITHOUT STATUS MIGRAINOSUS, NOT INTRACTABLE: ICD-10-CM

## 2024-02-08 ENCOUNTER — TELEPHONE (OUTPATIENT)
Dept: INTERNAL MEDICINE CLINIC | Facility: CLINIC | Age: 49
End: 2024-02-08

## 2024-02-09 RX ORDER — RIZATRIPTAN BENZOATE 10 MG/1
10 TABLET ORAL AS NEEDED
Qty: 30 TABLET | Refills: 0 | Status: SHIPPED | OUTPATIENT
Start: 2024-02-09

## 2024-02-23 NOTE — PATIENT INSTRUCTIONS
Get your labs done. You should be fasting for at least 10 hours. If you take a multivitamin with Biotin or any biotin product it should be held for 3 days prior to getting your labs done. Start sertraline 25 mg daily. Monitor for any side effects. See the neurologist and gastroenterologist.      Complete and mail in the cologuard. Schedule your mammogram.    Keep appointment with gynecologist.     Follow up if toenail fungus worsens or does not improve within the next 6 months. Use over the counter Debrox ear drops. Apply 3 drops into each ear once daily x 7 days. Let water run into the ears during shower and clean with washcloth or towel. Do not use ear buds to clean the ears. Return to clinic as needed for ear irrigation in 10-14 days.
Yes

## 2024-03-18 NOTE — TELEPHONE ENCOUNTER
Spoke with pt. She had an appointment today but lives in [place] and was going to be late so needed to cancel. Was using Estradiol cream for menopausal symptoms. Thought she was getting oral estrogen as discussed at her visit in February but received the cream. She tried the cream and does not like it. Would like to try oral estrogen. Also taking Progesterone. Pt also C/O lower abdominal \"swelling\". She had the same swelling when she was in 5/2022 and had BV. She is requesting a refill on the Metrogel as well. I let her know we typically like to see pt's for a culture prior to sending rx, but will get a message to Dr. Olamide Frausto and call with recommendations. Estrace order pended. Verbalized understanding. Him/He

## 2024-03-19 ENCOUNTER — TELEPHONE (OUTPATIENT)
Dept: INTERNAL MEDICINE CLINIC | Facility: CLINIC | Age: 49
End: 2024-03-19

## 2024-03-19 DIAGNOSIS — F33.41 RECURRENT MAJOR DEPRESSIVE DISORDER, IN PARTIAL REMISSION (HCC): ICD-10-CM

## 2024-03-19 NOTE — TELEPHONE ENCOUNTER
Incoming (mail or fax):  fax  Received from:  [facility]  Documentation given to:  Triage In    PA needed for Rizatriptan Tb 10mg

## 2024-03-20 ENCOUNTER — TELEPHONE (OUTPATIENT)
Dept: INTERNAL MEDICINE CLINIC | Facility: CLINIC | Age: 49
End: 2024-03-20

## 2024-03-20 RX ORDER — SERTRALINE HYDROCHLORIDE 25 MG/1
25 TABLET, FILM COATED ORAL DAILY
Qty: 90 TABLET | Refills: 0 | Status: SHIPPED | OUTPATIENT
Start: 2024-03-20

## 2024-03-20 NOTE — TELEPHONE ENCOUNTER
Filled out as much as I could. Placed in provider bin for completion and sig. Attached last office note. Thanks!

## 2024-03-20 NOTE — TELEPHONE ENCOUNTER
Last OV relevant to medication: 5/17/23   Last refill date: 8/31/23     #/refills: 90/1   When pt was asked to return for OV: Return in about 1 year (around 5/17/2024) for physical, med check.    Upcoming appt/reason: No future appointments.    Was pt informed of any over due labs: no active labs past year from our office

## 2024-04-29 ENCOUNTER — TELEPHONE (OUTPATIENT)
Dept: INTERNAL MEDICINE CLINIC | Facility: CLINIC | Age: 49
End: 2024-04-29

## 2024-04-29 DIAGNOSIS — Z12.31 SCREENING MAMMOGRAM FOR BREAST CANCER: Primary | ICD-10-CM

## 2024-04-29 NOTE — TELEPHONE ENCOUNTER
Pt due for PE last Physical 5/17/23. Pt wanted to get order for mammogram. When order for mammogram is placed then Pt will call to schedule Physical with Daphnie. Pt would like to do both in the same day. So Pt was advised to schedule mammogram first and then call us to schedule Physical   Please notify Pt when mammogram order is placed   Thank you

## 2024-04-30 NOTE — TELEPHONE ENCOUNTER
Pt already scheduled it     Future Appointments   Date Time Provider Department Center   5/14/2024  5:40 PM NIKI [place] [facility]  [facility]

## 2024-05-14 ENCOUNTER — OFFICE VISIT (OUTPATIENT)
Dept: INTERNAL MEDICINE CLINIC | Facility: CLINIC | Age: 49
End: 2024-05-14

## 2024-05-14 ENCOUNTER — HOSPITAL ENCOUNTER (OUTPATIENT)
Dept: MAMMOGRAPHY | Facility: HOSPITAL | Age: 49
Discharge: HOME | End: 2024-05-14
Attending: NURSE PRACTITIONER

## 2024-05-14 VITALS
SYSTOLIC BLOOD PRESSURE: 110 MMHG | BODY MASS INDEX: 24.62 KG/M2 | WEIGHT: 153.19 LBS | TEMPERATURE: 98 F | DIASTOLIC BLOOD PRESSURE: 70 MMHG | RESPIRATION RATE: 20 BRPM | OXYGEN SATURATION: 98 % | HEIGHT: 66.14 IN | HEART RATE: 80 BPM

## 2024-05-14 DIAGNOSIS — G43.009 MIGRAINE WITHOUT AURA AND WITHOUT STATUS MIGRAINOSUS, NOT INTRACTABLE: ICD-10-CM

## 2024-05-14 DIAGNOSIS — K21.9 GASTROESOPHAGEAL REFLUX DISEASE, UNSPECIFIED WHETHER ESOPHAGITIS PRESENT: ICD-10-CM

## 2024-05-14 DIAGNOSIS — Z85.828 HISTORY OF BASAL CELL CARCINOMA (BCC): ICD-10-CM

## 2024-05-14 DIAGNOSIS — F33.41 RECURRENT MAJOR DEPRESSIVE DISORDER, IN PARTIAL REMISSION (HCC): ICD-10-CM

## 2024-05-14 DIAGNOSIS — R53.83 FATIGUE, UNSPECIFIED TYPE: ICD-10-CM

## 2024-05-14 DIAGNOSIS — Z01.82 ENCOUNTER FOR ALLERGY TESTING: ICD-10-CM

## 2024-05-14 DIAGNOSIS — Z13.228 SCREENING FOR ENDOCRINE, METABOLIC AND IMMUNITY DISORDER: ICD-10-CM

## 2024-05-14 DIAGNOSIS — Z13.29 SCREENING FOR ENDOCRINE, METABOLIC AND IMMUNITY DISORDER: ICD-10-CM

## 2024-05-14 DIAGNOSIS — Z13.0 SCREENING FOR ENDOCRINE, METABOLIC AND IMMUNITY DISORDER: ICD-10-CM

## 2024-05-14 DIAGNOSIS — Z13.220 SCREENING FOR LIPID DISORDERS: ICD-10-CM

## 2024-05-14 DIAGNOSIS — Z13.0 SCREENING FOR DEFICIENCY ANEMIA: ICD-10-CM

## 2024-05-14 DIAGNOSIS — Z13.1 SCREENING FOR DIABETES MELLITUS: ICD-10-CM

## 2024-05-14 DIAGNOSIS — Z13.29 SCREENING FOR THYROID DISORDER: ICD-10-CM

## 2024-05-14 DIAGNOSIS — Z79.890 ON HORMONE REPLACEMENT THERAPY: ICD-10-CM

## 2024-05-14 DIAGNOSIS — Z00.00 ENCOUNTER FOR ANNUAL PHYSICAL EXAM: Primary | ICD-10-CM

## 2024-05-14 DIAGNOSIS — Z12.31 SCREENING MAMMOGRAM FOR BREAST CANCER: ICD-10-CM

## 2024-05-14 PROCEDURE — 99396 PREV VISIT EST AGE 40-64: CPT | Performed by: NURSE PRACTITIONER

## 2024-05-14 PROCEDURE — 77067 SCR MAMMO BI INCL CAD: CPT | Performed by: NURSE PRACTITIONER

## 2024-05-14 PROCEDURE — 77063 BREAST TOMOSYNTHESIS BI: CPT | Performed by: NURSE PRACTITIONER

## 2024-05-14 PROCEDURE — 99214 OFFICE O/P EST MOD 30 MIN: CPT | Performed by: NURSE PRACTITIONER

## 2024-05-14 RX ORDER — RIZATRIPTAN BENZOATE 10 MG/1
10 TABLET ORAL AS NEEDED
Qty: 30 TABLET | Refills: 0 | Status: SHIPPED | OUTPATIENT
Start: 2024-05-14

## 2024-05-14 RX ORDER — ESTRADIOL 1 MG/1
0.5 TABLET ORAL DAILY
COMMUNITY
Start: 2024-03-19

## 2024-05-14 NOTE — PROGRESS NOTES
CHIEF COMPLAINT     Chief Complaint   Patient presents with    Routine Physical     Sees Gyne Last Pap 12/4/23 HPV neg Mammo 4/27/22 US 5/24 dx; right ovarian cyst  Cologuard done 7/6/23 3 yrs repeat.     HPI:   Anna Marie Carranza is a 49 year old female who presents for a complete physical exam, med check, and other concerns.     Diet is fair, could be better. Exercise is nothing regular. Alcohol use is occasional. No smoking. She is , no kids. She works in real estate. She lives in [place].      Labs to be ordered. Vaccines reviewed. Due for covid and tetanus - declines all vaccines. Sees GYNE in [place] now (her mom lives in [place]). Mammogram is scheduled for today. Pap done 12/2023 normal, hpv negative. On HRT for menopausal symptoms. Colonoscopy done in 2004, declines further colonoscopies since she has a bad experience. Did cologuard 7/2023, due in 2026. Wears seatbelt. No texting and driving. Has history of BCC, sees derm regularly.      Depression: On Wellbutrin and sertraline last year. Improved since starting the sertraline but feel her menopause makes the depression worse. No SHIP. PHQ-2: 0, CSSR: No risk.      Migraines: Have gotten worse since menopause. Having to take Maxalt frequently. Has not seen neuro yet, needs a new referral.     GERD: Reports daily heartburn x4-5 years. Takes Pepcid almost daily with relief. Has not seen GI yet. Does not follow dietary precautions.    Complains of ongoing fatigue. Wondering if I can check some labs and do cortisol testing.     She would also like allergy testing done. No known allergies besides to PCN.     Wt Readings from Last 6 Encounters:   05/14/24 153 lb 3.2 oz (69.5 kg)   12/04/23 145 lb (65.8 kg)   05/17/23 142 lb 14.4 oz (64.8 kg)   02/22/23 140 lb (63.5 kg)   11/16/22 139 lb 3.2 oz (63.1 kg)   05/09/22 142 lb (64.4 kg)     Body mass index is 24.62 kg/m².     CHOLESTEROL, TOTAL (mg/dL)   Date Value   06/23/2023 158   05/18/2022 155     HDL CHOLESTEROL  (mg/dL)   Date Value   06/23/2023 69   05/18/2022 67     LDL-CHOLESTEROL (mg/dL (calc))   Date Value   06/23/2023 73   05/18/2022 74     AST (U/L)   Date Value   06/23/2023 18   05/18/2022 18     ALT (U/L)   Date Value   06/23/2023 19   05/18/2022 11        Current Outpatient Medications   Medication Sig Dispense Refill    estradiol 1 MG Oral Tab Take 0.5 tablets (0.5 mg total) by mouth daily.      Rizatriptan Benzoate 10 MG Oral Tab Take 1 tablet (10 mg total) by mouth as needed for Migraine. 30 tablet 0    sertraline 50 MG Oral Tab Take 1 tablet (50 mg total) by mouth daily. 90 tablet 3    progesterone (PROMETRIUM) 200 MG Oral Cap Take 1 capsule (200 mg total) by mouth nightly. 90 capsule 5    buPROPion  MG Oral Tablet 24 Hr Take 1 tablet (300 mg total) by mouth daily. 90 tablet 2    ibuprofen 200 MG Oral Tab Take 1 tablet (200 mg total) by mouth every 6 (six) hours as needed.        Allergies   Allergen Reactions    Penicillins RASH      Past Medical History:    Abdominal pain    Abdominal pain, epigastric    Abdominal pain, right lower quadrant    ADHD (attention deficit hyperactivity disorder)    Basal cell carcinoma (BCC) of left upper extremity    LEFT. Path of biopsy SUPERFICIAL MULTIFOCAL BASAL CELL CARCINOMA, MARGINS FREE    Bilateral breast cysts    Benign bilateral subcentimeter cysts.    Bipolar 2 disorder (HCC)    Bloating symptom    Blood in stool    BV (bacterial vaginosis)    Calculus of kidney    passed on her own    Cancer (HCC)    Skin cancer    Cancer screening    [employer] Common Hereditary Cancers Panel = Negative    Chronic gastritis    Depression    Deviated nasal septum    Gastric ulcer    \"many years ago\"     GERD (gastroesophageal reflux disease)    Hiatal hernia    small hiatal hernia noted on EGD    History of tobacco use disorder    Hypertrophy, nasal, turbinate    IBS (irritable bowel syndrome)    Irregular menses    8/22/2018 Per Reshma Centeno NP for  irregular menses. Wanting to know if she can still conceive. Missing periods at times. AMH < 0.015, FSH 21.5    Kidney lesion, native, left    Major depressive disorder in full remission (HCC)    Menopause    Migraine with aura    Usually aroung the time of menstrual period plus 2-3 other times during the month    Motion sickness    Nausea and vomiting    Frequent nausea. Occasional vomitting.    Neck pain    Pap smear for cervical cancer screening    04/03/2017 Pap NILM - LMP: 03/20/2017 - RAGINI WILKINSON NP. [facility]     Pap smear for cervical cancer screening    Negative, per careeverywhere    Postmenopausal bleeding    When she had started PO progesterone & tried to increase dose per provider. Taking it for postmenopausal vasomotor symptoms. The bleeding resolved upon lowering the dose of progesterone.     Sinusitis    Sleep apnea      Past Surgical History:   Procedure Laterality Date    Colonoscopy  12/10/2004    Colonoscopy - 12/10/2004 Path -COLONIC MUCOSA WITH NO PATHOLOGIC CHANGE. Done for blood in stool. Path per [facility]. Reports IVS/ PONV and woke during procedure.    Egd  07/19/2016    small hiatal hernia, reactive gastropathy. +NSAID use    Enlarge breast with implant Bilateral     Lasik      Sinus surgery    11/29/2012    SINONASAL MUCOSA WITH CHRONIC SINUSITIS, FRAGMENTS OF BONE AND CARTILAGE.    Skin surgery      Basal Cell carcinoma removed      Family History   Problem Relation Age of Onset    Migraines Mother     Thyroid disease Mother     Depression Mother     Other (diverticulitis) Mother         portion of colon removed     Other (bladder dysfunction) Mother         incontinence    Skin cancer Father     Other (Rheumatoid arthritis) Father     Fibromyalgia Sister     Depression Sister     Breast Cancer Sister     Breast Cancer Maternal Grandmother 50    Colon Cancer Maternal Grandfather     Skin cancer Paternal Grandmother     Skin cancer Paternal Grandfather     Prostate  Cancer Paternal Uncle       Social History:   Social History     Socioeconomic History    Marital status:    Tobacco Use    Smoking status: Never    Smokeless tobacco: Never   Vaping Use    Vaping status: Never Used   Substance and Sexual Activity    Alcohol use: Yes    Drug use: Never    Sexual activity: Yes     Partners: Male        REVIEW OF SYSTEMS:   GENERAL: feels well otherwise  SKIN: denies any unusual skin lesions  EYES: denies blurred vision or double vision  HEENT: denies nasal congestion, sinus pain or ST  LUNGS: denies shortness of breath with exertion  CARDIOVASCULAR: denies chest pain on exertion  GI: denies abdominal pain, denies heartburn  : denies vaginal discharge or dysuria  MUSCULOSKELETAL: denies back pain  NEURO: headaches+  PSYCH: see HPI  HEMATOLOGIC: denies hx of anemia  ENDOCRINE: denies thyroid history  ALL/ASTHMA: denies hx of allergy or asthma    EXAM:   /70 (BP Location: Right arm, Patient Position: Sitting, Cuff Size: adult)   Pulse 80   Temp 97.8 °F (36.6 °C)   Resp 20   Ht 5' 6.14\" (1.68 m)   Wt 153 lb 3.2 oz (69.5 kg)   SpO2 98%   BMI 24.62 kg/m²   Body mass index is 24.62 kg/m².   GENERAL: well developed, well nourished, in no apparent distress  SKIN: no rashes, no suspicious lesions  HEENT: atraumatic, normocephalic, ears are clear  EYES: PERRLA, EOMI, conjunctiva are clear  NECK: supple, no adenopathy, no bruits, no thyroid masses  BREAST: DEFERRED TO GYNE  LUNGS: clear to auscultation, no rhonchi, rales, or wheezing  CARDIO: RRR without murmur  GI: good BS's, no masses, HSM or tenderness  : DEFERRED TO GYNE   MUSCULOSKELETAL: No obvious joint deformity or swelling. Normal gait  EXTREMITIES: no cyanosis, clubbing or edema  NEURO: oriented times three, cranial nerves are grossly intact, motor and sensory are grossly intact    LABS:     Lab Results   Component Value Date    WBC 8.2 06/23/2023    RBC 4.46 06/23/2023    HGB 14.4 06/23/2023    HCT 42.5  06/23/2023    MCV 95.3 06/23/2023    MCH 32.3 06/23/2023    MCHC 33.9 06/23/2023    RDW 12.0 06/23/2023     06/23/2023      Lab Results   Component Value Date    GLU 94 06/23/2023    BUN 15 06/23/2023    BUNCREA NOT APPLICABLE 06/23/2023    CREATSERUM 0.96 06/23/2023    GFRNAA 72 05/18/2022    GFRAA 83 05/18/2022    CA 10.2 06/23/2023    ALKPHO 102 06/23/2023    AST 18 06/23/2023    ALT 19 06/23/2023    BILT 0.8 06/23/2023    TP 7.0 06/23/2023    ALB 4.4 06/23/2023    GLOBULIN 2.6 06/23/2023    AGRATIO 1.7 06/23/2023     06/23/2023    K 4.3 06/23/2023     06/23/2023    CO2 26 06/23/2023      Lab Results   Component Value Date    CHOLEST 158 06/23/2023    TRIG 75 06/23/2023    HDL 69 06/23/2023    LDL 73 06/23/2023    TCHDLRATIO 2.3 06/23/2023    NONHDLC 89 06/23/2023      Lab Results   Component Value Date    TSHT4 3.93 06/23/2023      Lab Results   Component Value Date    A1C 4.5 06/23/2023       IMAGING:     No results found.     ASSESSMENT AND PLAN:     1. Encounter for annual physical exam  Anna Marie Carranza is a 49 year old female who presents for a complete physical exam. Sees gyne, pap is UTD. Breast and pelvic deferred to gyne per patient request. Reviewed diet and exercise. Pt' s weight is Body mass index is 24.62 kg/m².. Recommended regular exercise. The patient indicates understanding of these issues and agrees to the plan.  -Do fasting labs  -Do mammogram as scheduled today  -Due for tetanus and covid booster, declines  -Cologuard done 2023, due in 2026  - CBC With Differential With Platelet  - Comp Metabolic Panel  - Lipid Panel  - Hemoglobin A1C  - TSH W Reflex To Free T4    2. Recurrent major depressive disorder, in partial remission (HCC)  -PHQ-2: 0, CSSR: No risk  -Increase sertraline to 50 mg every day, monitor for SE  -Continue Wellbutrin  - sertraline 50 MG Oral Tab; Take 1 tablet (50 mg total) by mouth daily.  Dispense: 90 tablet; Refill: 3    3. Migraine without aura and  without status migrainosus, not intractable  -Maxalt refilled  -See neuro for frequent migraines   - Rizatriptan Benzoate 10 MG Oral Tab; Take 1 tablet (10 mg total) by mouth as needed for Migraine.  Dispense: 30 tablet; Refill: 0  - NEURO - INTERNAL    4. Gastroesophageal reflux disease, unspecified whether esophagitis present  -Antireflux precautions discussed  -See GI   - EVALUATE & TREAT, GASTRO (INTERNAL)    5. Fatigue, unspecified type  -Will check vitamin D, b12, and folate  - B12 AND FOLATE [1565] [Q]  - VITAMIN D, 25-HYDROXY [64434][Q]    6. Encounter for allergy testing  -Allergy testing ordered per patient request   - ADULT FOOD ALLERGY PROFILE [47591] [Q]  - ALLERGENS, ZONE 8 [97026] [Q]    7. History of basal cell carcinoma (BCC)  -Continue to see derm    8. On hormone replacement therapy  -Continue to see gyne    9. Screening for deficiency anemia  - CBC With Differential With Platelet    10. Screening for endocrine, metabolic and immunity disorder  - Comp Metabolic Panel    11. Screening for lipid disorders  - Lipid Panel    12. Screening for diabetes mellitus  - Hemoglobin A1C    13. Screening for thyroid disorder  - TSH W Reflex To Free T4     Return in about 1 year (around 5/14/2025) for physical, med check.    Daphnie Matt, FRANKLYN  5/14/2024

## 2024-05-15 PROBLEM — Z80.42 FAMILY HISTORY OF PROSTATE CANCER: Status: ACTIVE | Noted: 2024-05-15

## 2024-05-15 PROBLEM — Z80.0 FAMILY HISTORY OF COLON CANCER: Status: ACTIVE | Noted: 2024-05-15

## 2024-05-15 PROBLEM — Z80.3 FAMILY HISTORY OF BREAST CANCER IN SISTER: Status: ACTIVE | Noted: 2024-05-15

## 2024-05-15 PROBLEM — Z80.8 FAMILY HISTORY OF SKIN CANCER: Status: ACTIVE | Noted: 2024-05-15

## 2024-06-11 DIAGNOSIS — G43.009 MIGRAINE WITHOUT AURA AND WITHOUT STATUS MIGRAINOSUS, NOT INTRACTABLE: ICD-10-CM

## 2024-06-11 RX ORDER — RIZATRIPTAN BENZOATE 10 MG/1
10 TABLET ORAL AS NEEDED
Qty: 30 TABLET | Refills: 0 | Status: SHIPPED | OUTPATIENT
Start: 2024-06-11

## 2024-06-11 NOTE — TELEPHONE ENCOUNTER
Neurology Medications Eithmh2506/11/2024 04:38 PM   Protocol Details In person appointment or virtual visit in the past 6 mos or appointment in next 3 mos      3. Migraine without aura and without status migrainosus, not intractable  -Maxalt refilled  -See neuro for frequent migraines   - Rizatriptan Benzoate 10 MG Oral Tab; Take 1 tablet (10 mg total) by mouth as needed for Migraine.  Dispense: 30 tablet; Refill: 0

## 2024-06-11 NOTE — TELEPHONE ENCOUNTER
Is this medication prescribed by the [facility] Providers? Yes     Did the patient contact the pharmacy directly?:  yes they said that they sent it     Is patient out of meds or supply very low?:  1 pill left     Medication Requested:  Rizatriptan Benzoate 10 MG Oral Tab     Dose:      Is patient requesting a 30 or 90 day supply?:  90    Pharmacy name and phone # or location:  [id number] - [place], [place] - [address] AT [address] & [facility], [phone], [phone]     Is the patient due for an appointment?: no   (if so, please schedule appt)    Additional Notes:      Please advise the patient refills take up to 72 business hours.

## 2024-07-17 DIAGNOSIS — G43.009 MIGRAINE WITHOUT AURA AND WITHOUT STATUS MIGRAINOSUS, NOT INTRACTABLE: ICD-10-CM

## 2024-07-17 RX ORDER — RIZATRIPTAN BENZOATE 10 MG/1
10 TABLET ORAL AS NEEDED
Qty: 30 TABLET | Refills: 0 | Status: SHIPPED | OUTPATIENT
Start: 2024-07-17

## 2024-07-17 NOTE — TELEPHONE ENCOUNTER
Neurology Medications Ufeyvo1307/17/2024 02:30 PM   Protocol Details In person appointment or virtual visit in the past 6 mos or appointment in next 3 mos

## 2024-07-17 NOTE — TELEPHONE ENCOUNTER
Patient is in [place] and has run out of medication.  Could a small supply be sent to [facility] #[id number] - [place], [place] - [address] AT [facility] [address], [phone], [phone] [[id number]].  Please advise.  Thank you!

## 2024-08-28 RX ORDER — BUPROPION HYDROCHLORIDE 300 MG/1
300 TABLET ORAL DAILY
Qty: 90 TABLET | Refills: 2 | Status: SHIPPED | OUTPATIENT
Start: 2024-08-28

## 2024-08-28 NOTE — TELEPHONE ENCOUNTER
Psychiatric Non-Scheduled (Anti-Anxiety) Passed08/26/2024 03:08 PM   Protocol Details In person appointment or virtual visit in the past 6 mos or appointment in next 3 mos    Depression Screening completed within the past 12 months      2. Recurrent major depressive disorder, in partial remission (HCC)  -PHQ-2: 0, CSSR: No risk  -Increase sertraline to 50 mg every day, monitor for SE  -Continue Wellbutrin  No future appointments.

## 2024-09-12 DIAGNOSIS — G43.009 MIGRAINE WITHOUT AURA AND WITHOUT STATUS MIGRAINOSUS, NOT INTRACTABLE: ICD-10-CM

## 2024-09-12 RX ORDER — RIZATRIPTAN BENZOATE 10 MG/1
10 TABLET ORAL AS NEEDED
Qty: 30 TABLET | Refills: 0 | Status: SHIPPED | OUTPATIENT
Start: 2024-09-12

## 2024-09-12 NOTE — TELEPHONE ENCOUNTER
Neurology Medications [id number]/12/2024 10:05 AM   Protocol Details In person appointment or virtual visit in the past 6 mos or appointment in next 3 mo          No future appointments.

## 2024-10-02 ENCOUNTER — TELEPHONE (OUTPATIENT)
Dept: INTERNAL MEDICINE CLINIC | Facility: CLINIC | Age: 49
End: 2024-10-02

## 2024-10-02 DIAGNOSIS — G43.009 MIGRAINE WITHOUT AURA AND WITHOUT STATUS MIGRAINOSUS, NOT INTRACTABLE: Primary | ICD-10-CM

## 2024-10-02 NOTE — TELEPHONE ENCOUNTER
Patient requesting a referral for [facility], [address] [place] [address], [place] [address],  [phone] fax [phone].She does not have a doctor name nor an appointment.

## 2024-10-07 NOTE — TELEPHONE ENCOUNTER
Talked to pt and advised to check with insurance. Pt said her insurance will cover it and pt is most of the time in [place] . Referral placed and faxed.

## 2024-10-07 NOTE — TELEPHONE ENCOUNTER
Patient following up on requested referral for doctor in [place]    Insurance: [facility]  Provider's Name?:  doesn't have specific [facility]  Provider's Specialty?: neurology    Reason for Visit?: migraines   Diagnosis?:    Number of Visits Requested?: multiple    Last Visit with Specialist?: n/a   Is Appt. Already Scheduled?: no        If so, Date?:    Once referral is approved pt can see referral via [facility]

## 2024-11-04 RX ORDER — ESTRADIOL 1 MG/1
1 TABLET ORAL DAILY
Qty: 90 TABLET | Refills: 0 | Status: SHIPPED | OUTPATIENT
Start: 2024-11-04

## 2024-11-26 DIAGNOSIS — G43.009 MIGRAINE WITHOUT AURA AND WITHOUT STATUS MIGRAINOSUS, NOT INTRACTABLE: ICD-10-CM

## 2024-11-27 RX ORDER — RIZATRIPTAN BENZOATE 10 MG/1
10 TABLET ORAL AS NEEDED
Qty: 30 TABLET | Refills: 0 | Status: SHIPPED | OUTPATIENT
Start: 2024-11-27

## 2024-11-27 NOTE — TELEPHONE ENCOUNTER
Is this medication prescribed by the [facility] Providers? yes    Did the patient contact the pharmacy directly?:  yes    Is patient out of meds or supply very low?:  4 left    Medication Requested:  RIZATRIPTAN BENZOATE    Dose:  10 MG    Is patient requesting a 30 or 90 day supply?:  30    Pharmacy name and phone # or location:  [facility] #[id number] - [place], [place] - [address] AT [address] & [facility], [phone], [phone]     Is the patient due for an appointment?: no  (if so, please schedule appt)    Additional Notes:      Please advise the patient refills take up to 72 business hours.  
Last OV relevant to medication: 5/14/24  Last refill date: 9/12/24 #30/refills: 0  When pt was asked to return for OV: 5/14/25  Upcoming appt/reason:   Future Appointments   Date Time Provider Department Center   12/18/2024  3:00 PM Porsche Brown MD [facility]   Was pt informed of any over due labs: utd      
58

## 2024-12-04 ENCOUNTER — TELEPHONE (OUTPATIENT)
Dept: INTERNAL MEDICINE CLINIC | Facility: CLINIC | Age: 49
End: 2024-12-04

## 2024-12-04 NOTE — TELEPHONE ENCOUNTER
Received call from pharmacist- insurance is making them document with refills now the number of headaches per month before they can release Rizatriptan. Advised them we do not have this info documented in notes, pharmacist cannot call patient to ask-states this info has to come from provider. Message sent to pt to see how many headaches/month on average.

## 2024-12-05 ENCOUNTER — TELEPHONE (OUTPATIENT)
Dept: INTERNAL MEDICINE CLINIC | Facility: CLINIC | Age: 49
End: 2024-12-05

## 2024-12-05 NOTE — TELEPHONE ENCOUNTER
Rec message from other encounter from  today:  \"Patient states that the pharmacy told her they need the dr office to call the pharmacy and give an antidote in order to fill the prescription for Rizatriptan Benzoate 10 MG. She says they told her it is something new the insurance needs to fill this medication script. \"    Spoke with patient. Migraines varies per month. Has about 10-20 migraines per month, but not every month.  Has 1 pill left.    Has appoinment with neuro on 12/18.  Called pharmacy. Verbal given of avg migraine per month to pharmacy. They state in future avg migraines per month has to be added as comment for insurance purpose.  Fyi- regarding above. Thanks!

## 2024-12-05 NOTE — TELEPHONE ENCOUNTER
Patient states that the pharmacy told her they need the dr office to call the pharmacy and give an antidote in order to fill the prescription for Rizatriptan Benzoate 10 MG. She says they told her it is something new the insurance needs to fill this medication script.

## 2024-12-05 NOTE — TELEPHONE ENCOUNTER
Incoming (mail or fax):  fax  Received from:  Britany  Documentation given to:  Triage incoming    Message to prescriber - needs annotation of # of headaches per month

## 2024-12-06 NOTE — TELEPHONE ENCOUNTER
We already called and gave verbal with this info- see note below from 12/5/24. Faxed this info to pharmacy again, fax confirmed

## 2024-12-18 ENCOUNTER — OFFICE VISIT (OUTPATIENT)
Dept: NEUROLOGY | Facility: CLINIC | Age: 49
End: 2024-12-18
Payer: COMMERCIAL

## 2024-12-18 VITALS
HEIGHT: 67 IN | OXYGEN SATURATION: 97 % | WEIGHT: 165 LBS | BODY MASS INDEX: 25.9 KG/M2 | DIASTOLIC BLOOD PRESSURE: 80 MMHG | SYSTOLIC BLOOD PRESSURE: 110 MMHG | RESPIRATION RATE: 16 BRPM | HEART RATE: 77 BPM

## 2024-12-18 DIAGNOSIS — Z84.89 FAMILY HISTORY OF BRAIN TUMOR: ICD-10-CM

## 2024-12-18 DIAGNOSIS — G43.909 EPISODIC MIGRAINE: ICD-10-CM

## 2024-12-18 DIAGNOSIS — M54.2 NECK PAIN: ICD-10-CM

## 2024-12-18 DIAGNOSIS — G44.52 NPDH (NEW PERSISTENT DAILY HEADACHE): Primary | ICD-10-CM

## 2024-12-18 PROCEDURE — 99205 OFFICE O/P NEW HI 60 MIN: CPT | Performed by: OTHER

## 2024-12-18 RX ORDER — RIMEGEPANT SULFATE 75 MG/75MG
75 TABLET, ORALLY DISINTEGRATING ORAL AS NEEDED
Qty: 8 TABLET | Refills: 0 | Status: SHIPPED | OUTPATIENT
Start: 2024-12-18 | End: 2025-12-18

## 2024-12-18 RX ORDER — RIMEGEPANT SULFATE 75 MG/75MG
75 TABLET, ORALLY DISINTEGRATING ORAL AS NEEDED
Qty: 16 TABLET | Refills: 3 | Status: SHIPPED | OUTPATIENT
Start: 2024-12-18 | End: 2025-12-18

## 2024-12-18 NOTE — PATIENT INSTRUCTIONS
Refill policies:    Allow 2-3 business days for refills; controlled substances may take longer.  Contact your pharmacy at least 5 days prior to running out of medication and have them send an electronic request or submit request through the “request refill” option in your [employer] account.  Refills are not addressed on weekends; covering physicians do not authorize routine medications on weekends.  No narcotics or controlled substances are refilled after noon on Fridays or by on call physicians.  By law, narcotics must be electronically prescribed.  A 30 day supply with no refills is the maximum allowed.  If your prescription is due for a refill, you may be due for a follow up appointment.  To best provide you care, patients receiving routine medications need to be seen at least once a year.  Patients receiving narcotic/controlled substance medications need to be seen at least once every 3 months.  In the event that your preferred pharmacy does not have the requested medication in stock (e.g. Backordered), it is your responsibility to find another pharmacy that has the requested medication available.  We will gladly send a new prescription to that pharmacy at your request.    Scheduling Tests:    If your physician has ordered radiology tests such as MRI or CT scans, please contact Central Scheduling at [phone] right away to schedule the test.  Once scheduled, the [facility] Centralized Referral Team will work with your insurance carrier to obtain pre-certification or prior authorization.  Depending on your insurance carrier, approval may take 3-10 days.  It is highly recommended patients assure they have received an authorization before having a test performed.  If test is done without insurance authorization, patient may be responsible for the entire amount billed.      Precertification and Prior Authorizations:  If your physician has recommended that you have a procedure or additional testing performed the [facility]  Centralized Referral Team will contact your insurance carrier to obtain pre-certification or prior authorization.    You are strongly encouraged to contact your insurance carrier to verify that your procedure/test has been approved and is a COVERED benefit.  Although the [facility] Centralized Referral Team does its due diligence, the insurance carrier gives the disclaimer that \"Although the procedure is authorized, this does not guarantee payment.\"    Ultimately the patient is responsible for payment.   Thank you for your understanding in this matter.  Paperwork Completion:  If you require FMLA or disability paperwork for your recovery, please make sure to either drop it off or have it faxed to our office at [phone]. Be sure the form has your name and date of birth on it.  The form will be faxed to our Forms Department and they will complete it for you.  There is a 25$ fee for all forms that need to be filled out.  Please be aware there is a 10-14 day turnaround time.  You will need to sign a release of information (THEO) form if your paperwork does not come with one.  You may call the Forms Department with any questions at [phone].  Their fax number is [phone].

## 2024-12-18 NOTE — PROGRESS NOTES
[facility] CONSULTATION    Date of consult: 12/18/2024    Assessment:    ICD-10-CM    1. NPDH (new persistent daily headache)  G44.52 MRI BRAIN (W+WO) (CPT=70553) [[id number]]      2. Family history of brain tumor  Z84.89 MRI BRAIN (W+WO) (CPT=70553) [[id number]]      3. Neck pain  M54.2 Referral to Physical Therapy and Rehab      4. Episodic migraine  G43.909 Rimegepant Sulfate (NURTEC) 75 MG Oral Tablet Dispersible          Plan:      Procedures    Referral to Physical Therapy and Rehab    MRI BRAIN (W+WO) (CPT=70553) [[id number]]     Headache diary advised  Migraine and Medication overuse headache education given  Nurtec every other day   See orders and medications filed with this encounter. The patient indicates understanding of these issues and agrees with the plan.  Discussed with patient/family regarding assessment, work up, care plan and possible adverse and side effects of the medications.  RTC 3 months, other options; anti CGRP mabs injection, if PT not helping neck pain, will obtain MRI c spine  Pt should go ER for any new or worsening symptoms and contact office for above tests' results, any possible side effects from medication or other concerns.    Subjective:   CC/Reason for consult: headache     HPI: Anna Marie Carranza is a 49 year old female with past medical history as listed below presents here for initial evaluation of worsening headache recently, right eye pain , headache from neck pain as well, no brain imaging done, family history of brain tumor per mother, pt is on antidepressant, take rizatriptan but not enough sometimes ; No new focal weakness, numbness, gait imbalance, vision or speech difficulties. 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    History/Other:   REVIEW OF SYSTEMS:  A comprehensive 14-point system was reviewed. Pertinent positives and negatives are noted in HPI.       Current Outpatient Medications:     Rimegepant Sulfate (NURTEC) 75 MG Oral Tablet  Dispersible, Take 75 mg by mouth as needed. Take one tablet at onset of migraine.  Maximum dose in 24 hours is 1 tablet (75mg)., Disp: 8 tablet, Rfl: 0    Rizatriptan Benzoate 10 MG Oral Tab, Take 1 tablet (10 mg total) by mouth as needed for Migraine., Disp: 30 tablet, Rfl: 0    ESTRADIOL 1 MG Oral Tab, TAKE 1 TABLET(1 MG) BY MOUTH DAILY, Disp: 90 tablet, Rfl: 0    BUPROPION  MG Oral Tablet 24 Hr, TAKE 1 TABLET(300 MG) BY MOUTH DAILY, Disp: 90 tablet, Rfl: 2    sertraline 50 MG Oral Tab, Take 1 tablet (50 mg total) by mouth daily., Disp: 90 tablet, Rfl: 3    progesterone (PROMETRIUM) 200 MG Oral Cap, Take 1 capsule (200 mg total) by mouth nightly., Disp: 90 capsule, Rfl: 5    ibuprofen 200 MG Oral Tab, Take 1 tablet (200 mg total) by mouth every 6 (six) hours as needed., Disp: , Rfl:   Allergies:  Allergies[1]  Past Medical History:    Abdominal pain    Abdominal pain, epigastric    Abdominal pain, right lower quadrant    ADHD (attention deficit hyperactivity disorder)    Basal cell carcinoma (BCC) of left upper extremity    LEFT. Path of biopsy SUPERFICIAL MULTIFOCAL BASAL CELL CARCINOMA, MARGINS FREE    Bilateral breast cysts    Benign bilateral subcentimeter cysts.    Bipolar 2 disorder (HCC)    Bloating symptom    Blood in stool    BV (bacterial vaginosis)    Calculus of kidney    passed on her own    Cancer screening    Invitae Common Hereditary Cancers Panel = Negative    Chronic gastritis    Depression    Deviated nasal septum    Gastric ulcer    \"many years ago\"     GERD (gastroesophageal reflux disease)    Hiatal hernia    small hiatal hernia noted on EGD    History of tobacco use disorder    Hypertrophy, nasal, turbinate    IBS (irritable bowel syndrome)    Irregular menses    [facility]. Wanting to know if she can still conceive. Missing periods at times. AMH < 0.015, FSH 21.5    Kidney lesion, native, left    Major depressive  disorder in full remission (HCC)    Menopause    Migraine with aura    Usually aroung the time of menstrual period plus 2-3 other times during the month    Motion sickness    Nausea and vomiting    Frequent nausea. Occasional vomitting.    Neck pain    Pap smear for cervical cancer screening    04/03/2017 Pap NILM - LMP: 03/20/2017 - RAGINI WILKINSON NP. [facility]     Pap smear for cervical cancer screening    Negative, per [facility]    Postmenopausal bleeding    When she had started PO progesterone & tried to increase dose per provider. Taking it for postmenopausal vasomotor symptoms. The bleeding resolved upon lowering the dose of progesterone.     Sinusitis    Sleep apnea     Past Surgical History:   Procedure Laterality Date    Colonoscopy  12/10/2004    Colonoscopy - 12/10/2004 Path -COLONIC MUCOSA WITH NO PATHOLOGIC CHANGE. Done for blood in stool. Path per [facility]. Reports IVS/ PONV and woke during procedure.    Egd  07/19/2016    small hiatal hernia, reactive gastropathy. +NSAID use    Enlarge breast with implant Bilateral     Lasik      Sinus surgery    11/29/2012    SINONASAL MUCOSA WITH CHRONIC SINUSITIS, FRAGMENTS OF BONE AND CARTILAGE.    Skin surgery      Basal Cell carcinoma removed     Social History:  Social History     Tobacco Use    Smoking status: Never    Smokeless tobacco: Never   Substance Use Topics    Alcohol use: Yes     Family History   Problem Relation Age of Onset    Migraines Mother     Thyroid disease Mother     Depression Mother     Other (diverticulitis) Mother         portion of colon removed     Other (bladder dysfunction) Mother         incontinence    Skin cancer Father     Other (Rheumatoid arthritis) Father     Fibromyalgia Sister     Depression Sister     Breast Cancer Sister 51    Breast Cancer Maternal Grandmother 50    Colon Cancer Maternal Grandfather     Skin cancer Paternal Grandmother     Skin cancer Paternal Grandfather     Prostate Cancer  Paternal Uncle       Objective:   Physical Examination:  /80   Pulse 77   Resp 16   Ht 67\"   Wt 165 lb (74.8 kg)   SpO2 97%   BMI 25.84 kg/m²   General: Awake and alert; in no acute distress  HEENT: Eye sclerae are anicteric; scalp is atraumatic  Neck: Supple  Cardiac: Regular rate and regular rhythm  Lungs: Clear   Abdomen:  non-tender  Extremities: No clubbing or cyanosis; moves extremities   Psychiatric: Normal mood and affect; answers questions appropriately  Dermatologic: No rashes; no edema  Neurological Examination:  Language: normal   Speech: no dysarthria  CN: II-XII intact  Motor strength: 5/5 all extremities  Tone: normal  DTRs: 2+ symmetric  Coordination: Normal  Sensory: symmetric  Gait: nl    Data Reviewed on 12/18/2024  Notes Reviewed on 12/18/2024  Labs Reviewed  on 12/18/2024    Porsche \"Angel\" MD Kevin   Neurology  [facility]  12/18/2024, 4:44 PM  Consultation Report: being sent/fax/route to requesting provider   CC: Miranda Espinoza MD         [1]   Allergies  Allergen Reactions    Penicillins RASH

## 2024-12-26 ENCOUNTER — TELEPHONE (OUTPATIENT)
Dept: NEUROLOGY | Facility: CLINIC | Age: 49
End: 2024-12-26

## 2024-12-26 NOTE — TELEPHONE ENCOUNTER
Received fax from [facility] requesting prior authorization for upcoming Brain MRI ww/o scheduled 12/31/24.    Phone: [phone]  Fax: [phone]

## 2024-12-27 NOTE — TELEPHONE ENCOUNTER
Message left for patient to advise regarding MRI. Advised she will need to call her insurance and find a facility that will be covered by insurance. Advised to call back with the information so this office can start a Prior Authorization.

## 2024-12-27 NOTE — TELEPHONE ENCOUNTER
Hello     I have tried to obtain authorization but the facility is not in network with patient plan. Please have patient reach out to health plan to verify in network facilities. Please update me with correspondence regarding this request.       Thank you,   [place]   Referral specialist

## 2024-12-27 NOTE — TELEPHONE ENCOUNTER
[facility]  Tax ID #[id number]  [id number]  [facility], [address] [address]    Phone: [phone]  Fax: [phone]    Appointment 12/31/24 at 8:45 am  MRI Brain w&w/o contrast

## 2025-01-09 ENCOUNTER — TELEPHONE (OUTPATIENT)
Dept: NEUROLOGY | Facility: CLINIC | Age: 50
End: 2025-01-09

## 2025-01-09 DIAGNOSIS — G43.909 EPISODIC MIGRAINE: ICD-10-CM

## 2025-01-09 RX ORDER — RIMEGEPANT SULFATE 75 MG/75MG
75 TABLET, ORALLY DISINTEGRATING ORAL EVERY OTHER DAY
COMMUNITY
Start: 2025-01-09 | End: 2025-01-10

## 2025-01-09 NOTE — TELEPHONE ENCOUNTER
Received prior authorization request for Nurtec 75mg from [employer]. Key: [id number]. Per office notes, patient takes Nurtec every other day. Awaiting determination.

## 2025-01-10 ENCOUNTER — TELEPHONE (OUTPATIENT)
Dept: NEUROLOGY | Facility: CLINIC | Age: 50
End: 2025-01-10

## 2025-01-10 RX ORDER — RIMEGEPANT SULFATE 75 MG/75MG
75 TABLET, ORALLY DISINTEGRATING ORAL EVERY OTHER DAY
Qty: 16 TABLET | Refills: 0 | Status: SHIPPED | OUTPATIENT
Start: 2025-01-10

## 2025-01-10 NOTE — TELEPHONE ENCOUNTER
Received prior authorization approval for Nurtec 75mg effective 12/11/24-1/10/26. Faxed to dispensing pharmacy, IPXI message sent to patient.

## 2025-01-13 NOTE — TELEPHONE ENCOUNTER
Hello     I am checking status of in network facilities. I sent a message in December. I was checking to see if patient has a updated in network facility.     Thank you,  [place]  Referral specialist

## 2025-01-13 NOTE — TELEPHONE ENCOUNTER
Message left on patient's voice mail (ok per HIPAA consent) reminding her to call back with name of Open MRI facility that is covered by her insurance.

## 2025-01-16 ENCOUNTER — TELEPHONE (OUTPATIENT)
Dept: NEUROLOGY | Facility: CLINIC | Age: 50
End: 2025-01-16

## 2025-01-16 NOTE — TELEPHONE ENCOUNTER
Patient change insurance as of 1/1/25 to [facility]. Patient states she had a denial for the nurtec and needed new Prior Authorization.     Prior Authorization completed on 1/10/25 was with [employer] and [facility].    Patient has been approved for 54 tablets for 90 days.

## 2025-02-10 DIAGNOSIS — Z13.220 SCREENING FOR LIPID DISORDERS: ICD-10-CM

## 2025-02-10 DIAGNOSIS — Z01.82 ENCOUNTER FOR ALLERGY TESTING: ICD-10-CM

## 2025-02-10 DIAGNOSIS — Z13.228 SCREENING FOR ENDOCRINE, METABOLIC AND IMMUNITY DISORDER: ICD-10-CM

## 2025-02-10 DIAGNOSIS — Z13.0 SCREENING FOR DEFICIENCY ANEMIA: Primary | ICD-10-CM

## 2025-02-10 DIAGNOSIS — G43.009 MIGRAINE WITHOUT AURA AND WITHOUT STATUS MIGRAINOSUS, NOT INTRACTABLE: ICD-10-CM

## 2025-02-10 DIAGNOSIS — Z13.29 SCREENING FOR THYROID DISORDER: ICD-10-CM

## 2025-02-10 DIAGNOSIS — Z13.29 SCREENING FOR ENDOCRINE, METABOLIC AND IMMUNITY DISORDER: ICD-10-CM

## 2025-02-10 DIAGNOSIS — Z13.1 SCREENING FOR DIABETES MELLITUS: ICD-10-CM

## 2025-02-10 DIAGNOSIS — Z13.0 SCREENING FOR ENDOCRINE, METABOLIC AND IMMUNITY DISORDER: ICD-10-CM

## 2025-02-10 DIAGNOSIS — R53.83 FATIGUE, UNSPECIFIED TYPE: ICD-10-CM

## 2025-02-11 ENCOUNTER — HOSPITAL ENCOUNTER (OUTPATIENT)
Dept: MRI IMAGING | Facility: HOSPITAL | Age: 50
Discharge: HOME | End: 2025-02-11
Attending: Other
Payer: COMMERCIAL

## 2025-02-11 DIAGNOSIS — G44.52 NPDH (NEW PERSISTENT DAILY HEADACHE): ICD-10-CM

## 2025-02-11 DIAGNOSIS — Z84.89 FAMILY HISTORY OF BRAIN TUMOR: ICD-10-CM

## 2025-02-11 PROCEDURE — 70553 MRI BRAIN STEM W/O & W/DYE: CPT | Performed by: OTHER

## 2025-02-11 PROCEDURE — A9575 INJ GADOTERATE MEGLUMI 0.1ML: HCPCS | Performed by: OTHER

## 2025-02-11 RX ORDER — GADOTERATE MEGLUMINE 376.9 MG/ML
15 INJECTION INTRAVENOUS
Status: COMPLETED | OUTPATIENT
Start: 2025-02-11 | End: 2025-02-11

## 2025-02-11 RX ADMIN — GADOTERATE MEGLUMINE 15 ML: 376.9 INJECTION INTRAVENOUS at 12:50:00

## 2025-02-11 NOTE — TELEPHONE ENCOUNTER
Last OV relevant to medication: 24  Last refill date:24 #30/refills:0  When pt was asked to return for OV: 25  Upcoming appt/reason:   Future Appointments   Date Time Provider Department Center   2025  2:20 PM Porsche Brown MD [facility]   Was pt informed of any over due labs: due- these are all  now.

## 2025-02-12 RX ORDER — RIZATRIPTAN BENZOATE 10 MG/1
10 TABLET ORAL AS NEEDED
Qty: 10 TABLET | Refills: 0 | Status: SHIPPED | OUTPATIENT
Start: 2025-02-12

## 2025-02-12 RX ORDER — RIZATRIPTAN BENZOATE 10 MG/1
10 TABLET ORAL AS NEEDED
Qty: 30 TABLET | Refills: 0 | OUTPATIENT
Start: 2025-02-12

## 2025-02-12 NOTE — TELEPHONE ENCOUNTER
Patient was calling because she doesn't see neurology until the 19th and can't discuss the med with them till then. She took the last pill today. Is there any way we could send her a bridge supply till the 19th? Please advise thanks!

## 2025-02-12 NOTE — TELEPHONE ENCOUNTER
I forwarded this refill to  her neurologist but it was denied. Can you send a short term refill unrtil she sees him in 2/19? Thanks!

## 2025-02-19 ENCOUNTER — OFFICE VISIT (OUTPATIENT)
Dept: NEUROLOGY | Facility: CLINIC | Age: 50
End: 2025-02-19
Payer: COMMERCIAL

## 2025-02-19 VITALS
HEART RATE: 84 BPM | SYSTOLIC BLOOD PRESSURE: 124 MMHG | RESPIRATION RATE: 16 BRPM | DIASTOLIC BLOOD PRESSURE: 76 MMHG | BODY MASS INDEX: 27 KG/M2 | WEIGHT: 170 LBS

## 2025-02-19 DIAGNOSIS — J32.8 OTHER CHRONIC SINUSITIS: ICD-10-CM

## 2025-02-19 DIAGNOSIS — M54.2 NECK PAIN: ICD-10-CM

## 2025-02-19 DIAGNOSIS — G43.009 MIGRAINE WITHOUT AURA AND WITHOUT STATUS MIGRAINOSUS, NOT INTRACTABLE: Primary | ICD-10-CM

## 2025-02-19 PROCEDURE — 99215 OFFICE O/P EST HI 40 MIN: CPT | Performed by: OTHER

## 2025-02-19 PROCEDURE — 3074F SYST BP LT 130 MM HG: CPT | Performed by: OTHER

## 2025-02-19 PROCEDURE — 3078F DIAST BP <80 MM HG: CPT | Performed by: OTHER

## 2025-02-19 RX ORDER — RIZATRIPTAN BENZOATE 10 MG/1
10 TABLET ORAL AS NEEDED
Qty: 10 TABLET | Refills: 0 | Status: SHIPPED | OUTPATIENT
Start: 2025-02-19

## 2025-02-19 NOTE — PROGRESS NOTES
[facility] Neurology Outpatient Progress Note  Date of service: 2/19/2025    Assessment:     ICD-10-CM    1. Migraine without aura and without status migrainosus, not intractable  G43.009 Rizatriptan Benzoate 10 MG Oral Tab      2. Neck pain  M54.2 Referral to Physical Therapy and Rehab      3. Other chronic sinusitis  J32.8 Refer to ENT          Plan:      Procedures    Referral to Physical Therapy and Rehab    Refer to ENT   Reviewed MRI with pt  Headache diary advised  Migraine education given  Nutec or rizatriptan prn  See orders and medications filed with this encounter. The patient indicates understanding of these issues and agrees with the plan.  Discussed with patient regarding assessment, care plan and possible adverse and side effects of the medications.  RTC 6 months  Pt should go ER for any new or worsening symptoms and contact office for above tests' results, any possible side effects from medication or other concerns.  A total of 40 minutes were spent on patient care,  more than 50% was spent counseling patient/family regarding studies' results, assessment, treatment options and care plan, 10 minutes were spent in (pre- and/or during visit) reviewing clinical data including imaging, labs and progress notes related to therapy or treatment.    Subjective:   History:  Patient here for a follow-up visit for migraine. Since last visit migraine is stable on as needed medication. Tolerating medications without side effects; here to review MRI and care plan.  She mentioned she has sinus issue and I referred her to see ENT.  Per initial visit note:  Anna Marie Carranza is a 49 year old female with past medical history as listed below presents here for initial evaluation of worsening headache recently, right eye pain , headache from neck pain as well, no brain imaging done, family history of brain tumor per mother, pt is on antidepressant, take rizatriptan but not enough sometimes ; No new focal weakness, numbness, gait  imbalance, vision or speech difficulties. 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     History/Other:   REVIEW OF SYSTEMS:  A 10-point system was reviewed. Pertinent positives and negatives are noted as above       Current Outpatient Medications:     Rizatriptan Benzoate 10 MG Oral Tab, Take 1 tablet (10 mg total) by mouth as needed for Migraine., Disp: 10 tablet, Rfl: 0    Rimegepant Sulfate (NURTEC) 75 MG Oral Tablet Dispersible, Take 75 mg by mouth every other day., Disp: 16 tablet, Rfl: 0    ESTRADIOL 1 MG Oral Tab, TAKE 1 TABLET(1 MG) BY MOUTH DAILY, Disp: 90 tablet, Rfl: 0    BUPROPION  MG Oral Tablet 24 Hr, TAKE 1 TABLET(300 MG) BY MOUTH DAILY, Disp: 90 tablet, Rfl: 2    sertraline 50 MG Oral Tab, Take 1 tablet (50 mg total) by mouth daily., Disp: 90 tablet, Rfl: 3    progesterone (PROMETRIUM) 200 MG Oral Cap, Take 1 capsule (200 mg total) by mouth nightly., Disp: 90 capsule, Rfl: 5    ibuprofen 200 MG Oral Tab, Take 1 tablet (200 mg total) by mouth every 6 (six) hours as needed., Disp: , Rfl:   Allergies:  Allergies[1]  Past Medical History:    Abdominal pain    Abdominal pain, epigastric    Abdominal pain, right lower quadrant    ADHD (attention deficit hyperactivity disorder)    Basal cell carcinoma (BCC) of left upper extremity    LEFT. Path of biopsy SUPERFICIAL MULTIFOCAL BASAL CELL CARCINOMA, MARGINS FREE    Bilateral breast cysts    Benign bilateral subcentimeter cysts.    Bipolar 2 disorder (HCC)    Bloating symptom    Blood in stool    BV (bacterial vaginosis)    Calculus of kidney    passed on her own    Cancer screening    Invitae Common Hereditary Cancers Panel = Negative    Chronic gastritis    Depression    Deviated nasal septum    Gastric ulcer    \"many years ago\"     GERD (gastroesophageal reflux disease)    Hiatal hernia    small hiatal hernia noted on EGD    History of tobacco use disorder    Hypertrophy, nasal, turbinate    IBS (irritable bowel syndrome)    Irregular  menses    8/22/2018 Per [facility] - Ragini Wilkinson NP for irregular menses. Wanting to know if she can still conceive. Missing periods at times. AMH < 0.015, FSH 21.5    Kidney lesion, native, left    Major depressive disorder in full remission    Menopause    Migraine with aura    Usually aroung the time of menstrual period plus 2-3 other times during the month    Motion sickness    Nausea and vomiting    Frequent nausea. Occasional vomitting.    Neck pain    NPDH (new persistent daily headache)    2/11/2025 MRI w & wo contrast - Trace ethmoid and maxillary mucosal thickening.  Fluid in the right mastoid air cells. No intracranial abnormality.       Pap smear for cervical cancer screening    04/03/2017 Pap NILM - LMP: 03/20/2017 - RAGINI WILKINSON NP. [facility]     Pap smear for cervical cancer screening    Negative, per careeverywhere    Postmenopausal bleeding    When she had started PO progesterone & tried to increase dose per provider. Taking it for postmenopausal vasomotor symptoms. The bleeding resolved upon lowering the dose of progesterone.     Sinusitis    Sleep apnea     Past Surgical History:   Procedure Laterality Date    Colonoscopy  12/10/2004    Colonoscopy - 12/10/2004 Path -COLONIC MUCOSA WITH NO PATHOLOGIC CHANGE. Done for blood in stool. Path per [facility]. Reports IVS/ PONV and woke during procedure.    Egd  07/19/2016    small hiatal hernia, reactive gastropathy. +NSAID use    Enlarge breast with implant Bilateral     Lasik      Sinus surgery    11/29/2012    SINONASAL MUCOSA WITH CHRONIC SINUSITIS, FRAGMENTS OF BONE AND CARTILAGE.    Skin surgery      Basal Cell carcinoma removed     Social History:  Social History     Tobacco Use    Smoking status: Never    Smokeless tobacco: Never   Substance Use Topics    Alcohol use: Yes     Family History   Problem Relation Age of Onset    Migraines Mother     Thyroid disease Mother     Depression Mother     Other  (diverticulitis) Mother         portion of colon removed     Other (bladder dysfunction) Mother         incontinence    Skin cancer Father     Other (Rheumatoid arthritis) Father     Fibromyalgia Sister     Depression Sister     Breast Cancer Sister 51    Breast Cancer Maternal Grandmother 50    Colon Cancer Maternal Grandfather     Skin cancer Paternal Grandmother     Skin cancer Paternal Grandfather     Prostate Cancer Paternal Uncle       Objective:   Neurological Examination:  /76   Pulse 84   Resp 16   Wt 170 lb (77.1 kg)   BMI 26.63 kg/m²   Mental status: A & O X 3  Language: no aphasia  Speech: no dysarthria  CN II-XII: intact   Motor strength: 5/5 all extremities  Tone: normal  DTRs: 2+ symmetric  Coordination: normal  Sensory: symmetric  Gait: normal    Test reviewed on 2/19/2025      Porsche \"Angel\"MD Kevin  Neurology  [facility]  2/19/2025, 2:54 PM  CC: Miranda Espinoza MD       [1]   Allergies  Allergen Reactions    Penicillins RASH

## 2025-02-19 NOTE — PATIENT INSTRUCTIONS
Refill policies:    Allow 2-3 business days for refills; controlled substances may take longer.  Contact your pharmacy at least 5 days prior to running out of medication and have them send an electronic request or submit request through the “request refill” option in your [employer] account.  Refills are not addressed on weekends; covering physicians do not authorize routine medications on weekends.  No narcotics or controlled substances are refilled after noon on Fridays or by on call physicians.  By law, narcotics must be electronically prescribed.  A 30 day supply with no refills is the maximum allowed.  If your prescription is due for a refill, you may be due for a follow up appointment.  To best provide you care, patients receiving routine medications need to be seen at least once a year.  Patients receiving narcotic/controlled substance medications need to be seen at least once every 3 months.  In the event that your preferred pharmacy does not have the requested medication in stock (e.g. Backordered), it is your responsibility to find another pharmacy that has the requested medication available.  We will gladly send a new prescription to that pharmacy at your request.    Scheduling Tests:    If your physician has ordered radiology tests such as MRI or CT scans, please contact Central Scheduling at [phone] right away to schedule the test.  Once scheduled, the [facility] Centralized Referral Team will work with your insurance carrier to obtain pre-certification or prior authorization.  Depending on your insurance carrier, approval may take 3-10 days.  It is highly recommended patients assure they have received an authorization before having a test performed.  If test is done without insurance authorization, patient may be responsible for the entire amount billed.      Precertification and Prior Authorizations:  If your physician has recommended that you have a procedure or additional testing performed the [facility]  Centralized Referral Team will contact your insurance carrier to obtain pre-certification or prior authorization.    You are strongly encouraged to contact your insurance carrier to verify that your procedure/test has been approved and is a COVERED benefit.  Although the [facility] Centralized Referral Team does its due diligence, the insurance carrier gives the disclaimer that \"Although the procedure is authorized, this does not guarantee payment.\"    Ultimately the patient is responsible for payment.   Thank you for your understanding in this matter.  Paperwork Completion:  If you require FMLA or disability paperwork for your recovery, please make sure to either drop it off or have it faxed to our office at [phone]. Be sure the form has your name and date of birth on it.  The form will be faxed to our Forms Department and they will complete it for you.  There is a 25$ fee for all forms that need to be filled out.  Please be aware there is a 10-14 day turnaround time.  You will need to sign a release of information (THEO) form if your paperwork does not come with one.  You may call the Forms Department with any questions at [phone].  Their fax number is [phone].

## 2025-02-24 DIAGNOSIS — R23.2 HOT FLASHES: ICD-10-CM

## 2025-02-24 RX ORDER — PROGESTERONE 200 MG/1
200 CAPSULE ORAL NIGHTLY
Qty: 90 CAPSULE | Refills: 5 | OUTPATIENT
Start: 2025-02-24

## 2025-04-09 DIAGNOSIS — G43.009 MIGRAINE WITHOUT AURA AND WITHOUT STATUS MIGRAINOSUS, NOT INTRACTABLE: ICD-10-CM

## 2025-04-09 RX ORDER — RIZATRIPTAN BENZOATE 10 MG/1
10 TABLET ORAL AS NEEDED
Qty: 10 TABLET | Refills: 2 | Status: SHIPPED | OUTPATIENT
Start: 2025-04-09

## 2025-04-09 NOTE — TELEPHONE ENCOUNTER
Medication: RIZATRIPTAN 10 MG TABLETS     Date of last refill: 02/19/2025 (#10/0)  [place]     Last office visit: 02/19/2025  Due back to clinic per last office note:  Around 08/19/2025  Date next office visit scheduled:    Future Appointments   Date Time Provider Department Center   8/20/2025 11:20 AM Porsche Brown MD [facility]           Last OV note recommendation:    Plan:          Procedures    Referral to Physical Therapy and Rehab    Refer to ENT   Reviewed MRI with pt  Headache diary advised  Migraine education given  Nutec or rizatriptan prn  See orders and medications filed with this encounter. The patient indicates understanding of these issues and agrees with the plan.  Discussed with patient regarding assessment, care plan and possible adverse and side effects of the medications.  RTC 6 months  Pt should go ER for any new or worsening symptoms and contact office for above tests' results, any possible side effects from medication or other concerns.  A total of 40 minutes were spent on patient care,  more than 50% was spent counseling patient/family regarding studies' results, assessment, treatment options and care plan, 10 minutes were spent in (pre- and/or during visit) reviewing clinical data including imaging, labs and progress notes related to therapy or treatment.

## 2025-04-22 DIAGNOSIS — R23.2 HOT FLASHES: ICD-10-CM

## 2025-04-22 RX ORDER — PROGESTERONE 200 MG/1
200 CAPSULE ORAL NIGHTLY
Qty: 90 CAPSULE | Refills: 5 | OUTPATIENT
Start: 2025-04-22

## 2025-06-10 DIAGNOSIS — G43.909 EPISODIC MIGRAINE: ICD-10-CM

## 2025-06-10 RX ORDER — RIMEGEPANT SULFATE 75 MG/75MG
1 TABLET, ORALLY DISINTEGRATING ORAL EVERY OTHER DAY
Qty: 16 TABLET | Refills: 0 | Status: SHIPPED | OUTPATIENT
Start: 2025-06-10

## 2025-06-10 NOTE — TELEPHONE ENCOUNTER
Medication: NURTEC 75 MG Oral Tablet Dispersible      Date of last refill: 01/10/2025 (#16/0)  [place]     Last office visit: 02/19/2025  Due back to clinic per last office note:  6 months   Date next office visit scheduled:    Future Appointments   Date Time Provider Department Center   8/20/2025 11:20 AM Porsche Brown MD [facility]           Last OV note recommendation:    Assessment:       ICD-10-CM     1. Migraine without aura and without status migrainosus, not intractable  G43.009 Rizatriptan Benzoate 10 MG Oral Tab       2. Neck pain  M54.2 Referral to Physical Therapy and Rehab       3. Other chronic sinusitis  J32.8 Refer to ENT             Plan:          Procedures    Referral to Physical Therapy and Rehab    Refer to ENT   Reviewed MRI with pt  Headache diary advised  Migraine education given  Nutec or rizatriptan prn  See orders and medications filed with this encounter. The patient indicates understanding of these issues and agrees with the plan.  Discussed with patient regarding assessment, care plan and possible adverse and side effects of the medications.  RTC 6 months  Pt should go ER for any new or worsening symptoms and contact office for above tests' results, any possible side effects from medication or other concerns.  A total of 40 minutes were spent on patient care,  more than 50% was spent counseling patient/family regarding studies' results, assessment, treatment options and care plan, 10 minutes were spent in (pre- and/or during visit) reviewing clinical data including imaging, labs and progress notes related to therapy or treatment.

## 2025-07-31 DIAGNOSIS — G43.909 EPISODIC MIGRAINE: ICD-10-CM

## 2025-07-31 DIAGNOSIS — G43.009 MIGRAINE WITHOUT AURA AND WITHOUT STATUS MIGRAINOSUS, NOT INTRACTABLE: ICD-10-CM

## 2025-08-01 RX ORDER — RIMEGEPANT SULFATE 75 MG/75MG
1 TABLET, ORALLY DISINTEGRATING ORAL EVERY OTHER DAY
Qty: 16 TABLET | Refills: 0 | Status: SHIPPED | OUTPATIENT
Start: 2025-08-01

## 2025-08-01 RX ORDER — RIZATRIPTAN BENZOATE 10 MG/1
10 TABLET ORAL AS NEEDED
Qty: 10 TABLET | Refills: 2 | Status: SHIPPED | OUTPATIENT
Start: 2025-08-01

## 2025-08-26 DIAGNOSIS — F33.41 RECURRENT MAJOR DEPRESSIVE DISORDER, IN PARTIAL REMISSION: ICD-10-CM

## (undated) DIAGNOSIS — Z12.31 SCREENING MAMMOGRAM FOR BREAST CANCER: Primary | ICD-10-CM

## (undated) NOTE — LETTER
03/07/22    Anna Marie Flannery  [address] [place] [address]    Dear Grecia Espinoza records indicate that you have outstanding lab work. To schedule please call [email or web address] or visit their website. Please request your results to be faxed to our office at [phone] so we can update your records. If you choose to use [facility] labs, please call Central Scheduling at [phone]. Please remember to fast for 10 hours. You may drink water up until the time of your lab appointment. Morning medication is ok to take the morning of your lab appointment. If you take a Multi-Vitamin with Biotin or any Biotin products, please hold for 3 days prior to getting your labs done. Comp Metabolic Panel (14)      CBC With Differential With Platelet      Lipid Panel      TSH W Reflex To Free T4      [facility]  [facility]     To provide you with the best possible care, please complete these orders at your earliest convenience. If you have recently completed these orders please disregard this letter. If you have any questions please call the office at Dept: [phone].      Thank you,     Dr. Ousmane Bourgeois MD